# Patient Record
Sex: FEMALE | Race: OTHER | Employment: FULL TIME | URBAN - METROPOLITAN AREA
[De-identification: names, ages, dates, MRNs, and addresses within clinical notes are randomized per-mention and may not be internally consistent; named-entity substitution may affect disease eponyms.]

---

## 2019-07-26 ENCOUNTER — HOSPITAL ENCOUNTER (EMERGENCY)
Age: 24
Discharge: HOME | End: 2019-07-26
Attending: EMERGENCY MEDICINE
Payer: COMMERCIAL

## 2019-07-26 ENCOUNTER — APPOINTMENT (OUTPATIENT)
Dept: GENERAL RADIOLOGY | Age: 24
End: 2019-07-26
Payer: COMMERCIAL

## 2019-07-26 VITALS
WEIGHT: 182 LBS | HEIGHT: 59 IN | RESPIRATION RATE: 16 BRPM | OXYGEN SATURATION: 100 % | BODY MASS INDEX: 36.69 KG/M2 | TEMPERATURE: 98.5 F | SYSTOLIC BLOOD PRESSURE: 114 MMHG | HEART RATE: 81 BPM | DIASTOLIC BLOOD PRESSURE: 79 MMHG

## 2019-07-26 DIAGNOSIS — S93.491A SPRAIN OF ANTERIOR TALOFIBULAR LIGAMENT OF RIGHT ANKLE, INITIAL ENCOUNTER: Primary | ICD-10-CM

## 2019-07-26 PROCEDURE — 99283 EMERGENCY DEPT VISIT LOW MDM: CPT

## 2019-07-26 PROCEDURE — 6360000002 HC RX W HCPCS: Performed by: PHYSICIAN ASSISTANT

## 2019-07-26 PROCEDURE — 96372 THER/PROPH/DIAG INJ SC/IM: CPT

## 2019-07-26 PROCEDURE — 73610 X-RAY EXAM OF ANKLE: CPT

## 2019-07-26 RX ORDER — IBUPROFEN 800 MG/1
800 TABLET ORAL EVERY 8 HOURS PRN
Qty: 30 TABLET | Refills: 0 | Status: SHIPPED | OUTPATIENT
Start: 2019-07-26 | End: 2020-09-18

## 2019-07-26 RX ORDER — KETOROLAC TROMETHAMINE 30 MG/ML
15 INJECTION, SOLUTION INTRAMUSCULAR; INTRAVENOUS ONCE
Status: COMPLETED | OUTPATIENT
Start: 2019-07-26 | End: 2019-07-26

## 2019-07-26 RX ORDER — TRAZODONE HYDROCHLORIDE 50 MG/1
50 TABLET ORAL NIGHTLY
COMMUNITY
End: 2020-09-18

## 2019-07-26 RX ADMIN — KETOROLAC TROMETHAMINE 15 MG: 30 INJECTION, SOLUTION INTRAMUSCULAR at 20:05

## 2019-07-26 ASSESSMENT — PAIN DESCRIPTION - PAIN TYPE: TYPE: ACUTE PAIN

## 2019-07-26 ASSESSMENT — PAIN DESCRIPTION - ONSET: ONSET: SUDDEN

## 2019-07-26 ASSESSMENT — ENCOUNTER SYMPTOMS
ABDOMINAL PAIN: 0
COUGH: 0
BACK PAIN: 0
EYE PAIN: 0

## 2019-07-26 ASSESSMENT — PAIN DESCRIPTION - DESCRIPTORS: DESCRIPTORS: ACHING;SHARP;DISCOMFORT

## 2019-07-26 ASSESSMENT — PAIN DESCRIPTION - FREQUENCY: FREQUENCY: CONTINUOUS

## 2019-07-26 ASSESSMENT — PAIN SCALES - GENERAL: PAINLEVEL_OUTOF10: 9

## 2019-07-26 NOTE — ED PROVIDER NOTES
Dispense:  30 tablet     Refill:  0       CONSULTS:  None    MEDICAL Damion Mahmood / LAUREN / Michael Earnestine is a 22 y.o. female who presents the emergency department with right ankle pain. Vital signs were stable on presentation and remained stable throughout her stay. Thorough history and physical exam was performed as detailed above. Patient presents the emergency department with right ankle pain that began yesterday after working out. She states she has had difficulty ambulating throughout the day today. She took 400 mg of ibuprofen this morning with minimal relief. On physical exam, patient has tenderness palpation throughout the lateral malleolus. Mild tenderness palpation to medial malleolus. No tenderness palpation to distal tibia or throughout the foot. Neurovascularly intact distal to injury. X-ray was obtained which showed no acute osseous abnormalities. Patient was given 15 mg of Toradol in the emergency department for her symptoms which did slightly improve her pain. I have low suspicion for fracture given my physical exam findings along with negative imaging. I believe ankle sprain is likely causing her symptoms. She will be placed in an Ace bandage and given crutches. She will also be given a prescription for 800 mg ibuprofen for continued pain. The plan was thoroughly discussed with the patient she is agreeable at this time. She was given strict return precautions and discharged home. This patient was also evaluated by the attending physician. All care plans were discussed and agreed upon. Clinical Impression     1.  Sprain of anterior talofibular ligament of right ankle, initial encounter        Disposition     PATIENT REFERRED TO:  [facility] Emergency Department  [address] [facility]  [address] [facility]  Go to   If symptoms worsen      DISCHARGE MEDICATIONS:  New Prescriptions    IBUPROFEN (ADVIL;MOTRIN) 800 MG TABLET    Take 1

## 2019-08-15 ENCOUNTER — HOSPITAL ENCOUNTER (EMERGENCY)
Age: 24
Discharge: HOME | End: 2019-08-15
Attending: EMERGENCY MEDICINE
Payer: COMMERCIAL

## 2019-08-15 VITALS
HEART RATE: 69 BPM | BODY MASS INDEX: 36.36 KG/M2 | SYSTOLIC BLOOD PRESSURE: 101 MMHG | OXYGEN SATURATION: 98 % | DIASTOLIC BLOOD PRESSURE: 72 MMHG | RESPIRATION RATE: 20 BRPM | TEMPERATURE: 98 F | WEIGHT: 180 LBS

## 2019-08-15 DIAGNOSIS — G40.919 BREAKTHROUGH SEIZURE (HCC): Primary | ICD-10-CM

## 2019-08-15 LAB
AMPHETAMINE SCREEN, URINE: ABNORMAL
ANION GAP SERPL CALCULATED.3IONS-SCNC: 11 MMOL/L (ref 3–16)
BACTERIA: ABNORMAL /HPF
BARBITURATE SCREEN URINE: POSITIVE
BENZODIAZEPINE SCREEN, URINE: ABNORMAL
BILIRUBIN URINE: NEGATIVE
BLOOD, URINE: ABNORMAL
BUN BLDV-MCNC: 13 MG/DL (ref 7–20)
CALCIUM SERPL-MCNC: 8.9 MG/DL (ref 8.3–10.6)
CANNABINOID SCREEN URINE: ABNORMAL
CHLORIDE BLD-SCNC: 102 MMOL/L (ref 99–110)
CLARITY: ABNORMAL
CO2: 22 MMOL/L (ref 21–32)
COCAINE METABOLITE SCREEN URINE: ABNORMAL
COLOR: YELLOW
CREAT SERPL-MCNC: 0.7 MG/DL (ref 0.6–1.1)
EPITHELIAL CELLS, UA: ABNORMAL /HPF
GFR AFRICAN AMERICAN: >60
GFR NON-AFRICAN AMERICAN: >60
GLUCOSE BLD-MCNC: 96 MG/DL (ref 70–99)
GLUCOSE URINE: NEGATIVE MG/DL
KETONES, URINE: 15 MG/DL
LEUKOCYTE ESTERASE, URINE: NEGATIVE
Lab: ABNORMAL
MAGNESIUM: 1.8 MG/DL (ref 1.8–2.4)
METHADONE SCREEN, URINE: ABNORMAL
MICROSCOPIC EXAMINATION: YES
NITRITE, URINE: NEGATIVE
OPIATE SCREEN URINE: ABNORMAL
OXYCODONE URINE: ABNORMAL
PH UA: 5
PH UA: 6 (ref 5–8)
PHENCYCLIDINE SCREEN URINE: ABNORMAL
POTASSIUM REFLEX MAGNESIUM: 3.5 MMOL/L (ref 3.5–5.1)
PROPOXYPHENE SCREEN: ABNORMAL
PROTEIN UA: 30 MG/DL
RBC UA: >100 /HPF (ref 0–2)
SODIUM BLD-SCNC: 135 MMOL/L (ref 136–145)
SPECIFIC GRAVITY UA: >=1.03 (ref 1–1.03)
URINE REFLEX TO CULTURE: ABNORMAL
URINE TYPE: ABNORMAL
UROBILINOGEN, URINE: 0.2 E.U./DL
WBC UA: ABNORMAL /HPF (ref 0–5)

## 2019-08-15 PROCEDURE — 80048 BASIC METABOLIC PNL TOTAL CA: CPT

## 2019-08-15 PROCEDURE — 81001 URINALYSIS AUTO W/SCOPE: CPT

## 2019-08-15 PROCEDURE — 80183 DRUG SCRN QUANT OXCARBAZEPIN: CPT

## 2019-08-15 PROCEDURE — 6370000000 HC RX 637 (ALT 250 FOR IP): Performed by: STUDENT IN AN ORGANIZED HEALTH CARE EDUCATION/TRAINING PROGRAM

## 2019-08-15 PROCEDURE — 80307 DRUG TEST PRSMV CHEM ANLYZR: CPT

## 2019-08-15 PROCEDURE — 83735 ASSAY OF MAGNESIUM: CPT

## 2019-08-15 PROCEDURE — 99284 EMERGENCY DEPT VISIT MOD MDM: CPT

## 2019-08-15 RX ORDER — IBUPROFEN 400 MG/1
800 TABLET ORAL ONCE
Status: COMPLETED | OUTPATIENT
Start: 2019-08-15 | End: 2019-08-15

## 2019-08-15 RX ADMIN — IBUPROFEN 800 MG: 400 TABLET ORAL at 14:56

## 2019-08-15 ASSESSMENT — PAIN - FUNCTIONAL ASSESSMENT: PAIN_FUNCTIONAL_ASSESSMENT: PREVENTS OR INTERFERES SOME ACTIVE ACTIVITIES AND ADLS

## 2019-08-15 ASSESSMENT — PAIN DESCRIPTION - FREQUENCY: FREQUENCY: CONTINUOUS

## 2019-08-15 ASSESSMENT — PAIN DESCRIPTION - DESCRIPTORS: DESCRIPTORS: CONSTANT

## 2019-08-15 ASSESSMENT — PAIN SCALES - GENERAL
PAINLEVEL_OUTOF10: 5
PAINLEVEL_OUTOF10: 6

## 2019-08-15 ASSESSMENT — PAIN DESCRIPTION - ORIENTATION: ORIENTATION: ANTERIOR

## 2019-08-15 ASSESSMENT — PAIN DESCRIPTION - ONSET: ONSET: SUDDEN

## 2019-08-15 ASSESSMENT — PAIN DESCRIPTION - PROGRESSION: CLINICAL_PROGRESSION: GRADUALLY WORSENING

## 2019-08-15 ASSESSMENT — PAIN DESCRIPTION - LOCATION: LOCATION: HEAD

## 2019-08-15 ASSESSMENT — PAIN SCALES - WONG BAKER: WONGBAKER_NUMERICALRESPONSE: 4

## 2019-08-15 ASSESSMENT — PAIN DESCRIPTION - PAIN TYPE: TYPE: ACUTE PAIN

## 2019-08-15 NOTE — ED PROVIDER NOTES
ED Attending Attestation Note     Date of evaluation: 8/15/2019    This patient was seen by the resident. I have seen and examined the patient, agree with the workup, evaluation, management and diagnosis. The care plan has been discussed. My assessment reveals Patient with history of seizures both absence and grand mal who presents after 2 absence seizure's today at work. Patient is on Trileptal and reports missing no doses recently. On exam alert and oriented x3. Moves all 4 extremities. No seizure activity noted during my exam.  He sees a neurologist in [facility].      Hannah Magaña MD  08/15/19 8206

## 2019-08-20 LAB — OXCARBAZEPINE: <1 UG/ML (ref 3–35)

## 2019-10-27 ENCOUNTER — HOSPITAL ENCOUNTER (EMERGENCY)
Age: 24
Discharge: HOME | End: 2019-10-27
Attending: EMERGENCY MEDICINE
Payer: COMMERCIAL

## 2019-10-27 VITALS
BODY MASS INDEX: 36.29 KG/M2 | RESPIRATION RATE: 20 BRPM | DIASTOLIC BLOOD PRESSURE: 80 MMHG | OXYGEN SATURATION: 100 % | HEART RATE: 78 BPM | SYSTOLIC BLOOD PRESSURE: 109 MMHG | HEIGHT: 59 IN | WEIGHT: 180 LBS | TEMPERATURE: 98.7 F

## 2019-10-27 DIAGNOSIS — G43.C1 INTRACTABLE PERIODIC HEADACHE SYNDROME: Primary | ICD-10-CM

## 2019-10-27 PROCEDURE — 80183 DRUG SCRN QUANT OXCARBAZEPIN: CPT

## 2019-10-27 PROCEDURE — 2580000003 HC RX 258: Performed by: STUDENT IN AN ORGANIZED HEALTH CARE EDUCATION/TRAINING PROGRAM

## 2019-10-27 PROCEDURE — 96361 HYDRATE IV INFUSION ADD-ON: CPT

## 2019-10-27 PROCEDURE — 96374 THER/PROPH/DIAG INJ IV PUSH: CPT

## 2019-10-27 PROCEDURE — 6370000000 HC RX 637 (ALT 250 FOR IP): Performed by: STUDENT IN AN ORGANIZED HEALTH CARE EDUCATION/TRAINING PROGRAM

## 2019-10-27 PROCEDURE — 6360000002 HC RX W HCPCS: Performed by: EMERGENCY MEDICINE

## 2019-10-27 PROCEDURE — 99283 EMERGENCY DEPT VISIT LOW MDM: CPT

## 2019-10-27 RX ORDER — KETOROLAC TROMETHAMINE 30 MG/ML
15 INJECTION, SOLUTION INTRAMUSCULAR; INTRAVENOUS ONCE
Status: COMPLETED | OUTPATIENT
Start: 2019-10-27 | End: 2019-10-27

## 2019-10-27 RX ORDER — SODIUM CHLORIDE, SODIUM LACTATE, POTASSIUM CHLORIDE, AND CALCIUM CHLORIDE .6; .31; .03; .02 G/100ML; G/100ML; G/100ML; G/100ML
1000 INJECTION, SOLUTION INTRAVENOUS ONCE
Status: COMPLETED | OUTPATIENT
Start: 2019-10-27 | End: 2019-10-27

## 2019-10-27 RX ORDER — ACETAMINOPHEN 500 MG
1000 TABLET ORAL ONCE
Status: COMPLETED | OUTPATIENT
Start: 2019-10-27 | End: 2019-10-27

## 2019-10-27 RX ORDER — KETOROLAC TROMETHAMINE 30 MG/ML
15 INJECTION, SOLUTION INTRAMUSCULAR; INTRAVENOUS ONCE
Status: DISCONTINUED | OUTPATIENT
Start: 2019-10-27 | End: 2019-10-27

## 2019-10-27 RX ADMIN — SODIUM CHLORIDE, POTASSIUM CHLORIDE, SODIUM LACTATE AND CALCIUM CHLORIDE 1000 ML: 600; 310; 30; 20 INJECTION, SOLUTION INTRAVENOUS at 17:04

## 2019-10-27 RX ADMIN — ACETAMINOPHEN 1000 MG: 500 TABLET ORAL at 17:04

## 2019-10-27 RX ADMIN — KETOROLAC TROMETHAMINE 15 MG: 30 INJECTION, SOLUTION INTRAMUSCULAR at 17:04

## 2019-10-27 ASSESSMENT — ENCOUNTER SYMPTOMS
TROUBLE SWALLOWING: 0
CONSTIPATION: 0
CHEST TIGHTNESS: 0
SINUS PAIN: 0
ABDOMINAL PAIN: 0
WHEEZING: 0
APNEA: 0
DIARRHEA: 0
COUGH: 0
RECTAL PAIN: 0
NAUSEA: 0
ABDOMINAL DISTENTION: 0
STRIDOR: 0
SORE THROAT: 0
BACK PAIN: 0
RHINORRHEA: 0
SHORTNESS OF BREATH: 0
VOMITING: 0

## 2019-10-27 ASSESSMENT — PAIN DESCRIPTION - PAIN TYPE: TYPE: ACUTE PAIN

## 2019-10-27 ASSESSMENT — PAIN SCALES - GENERAL
PAINLEVEL_OUTOF10: 4
PAINLEVEL_OUTOF10: 9

## 2019-10-27 ASSESSMENT — PAIN DESCRIPTION - LOCATION: LOCATION: HEAD

## 2019-10-29 LAB — OXCARBAZEPINE: <1 UG/ML (ref 3–35)

## 2020-09-12 ENCOUNTER — HOSPITAL ENCOUNTER (EMERGENCY)
Age: 25
Discharge: HOME | End: 2020-09-12
Attending: EMERGENCY MEDICINE
Payer: COMMERCIAL

## 2020-09-12 VITALS
DIASTOLIC BLOOD PRESSURE: 94 MMHG | TEMPERATURE: 98.7 F | RESPIRATION RATE: 18 BRPM | BODY MASS INDEX: 31.28 KG/M2 | SYSTOLIC BLOOD PRESSURE: 128 MMHG | WEIGHT: 170 LBS | HEIGHT: 62 IN | OXYGEN SATURATION: 99 % | HEART RATE: 90 BPM

## 2020-09-12 PROCEDURE — 99284 EMERGENCY DEPT VISIT MOD MDM: CPT

## 2020-09-12 ASSESSMENT — ENCOUNTER SYMPTOMS
EYE PAIN: 0
WHEEZING: 0
CHEST TIGHTNESS: 0
COUGH: 0
CONSTIPATION: 0
DIARRHEA: 0
NAUSEA: 0
SHORTNESS OF BREATH: 0
ABDOMINAL PAIN: 0
VOMITING: 0
BLOOD IN STOOL: 0

## 2020-09-12 NOTE — ED PROVIDER NOTES
[address] [facility] RESIDENT NOTE       Date of evaluation: 9/12/2020    Chief Complaint     Seizures (needs return to work note after a sz yesterday)      of Present Illness     Neymar Elise is a 26 y.o. female history of seizures on Trileptal presents emergency department for evaluation of seizures. Patient states that she had 2 seizures yesterday. The seizures occurred while she was laying in bed and were witnessed by wife. Seizures both lasted less than a minute, tonic-clonic, and patient does not remember them. No urinary or bowel incontinence no tongue biting. Patient states this is typical of her seizures. Patient denies any head injury or fall. Patient states that she is on Trileptal but she has had a hard month and has missed a few doses this month, including last weekend. Patient is seeing neurology and has an upcoming EMU appointment. Patient denies recently feeling ill, any symptoms of chest pain shortness of breath lightheadedness headaches, confusion. Patient has been at her baseline since the seizures. Patient went to work this morning and was told that if she has more than 1 seizure she needs to be evaluated by provider. Patient called her neurologist but he is not in the office until [phone], prompting her visit to the emergency department. Review of Systems     Review of Systems   Constitutional: Negative for appetite change, chills and fever. HENT: Negative for ear pain. Eyes: Negative for pain. Respiratory: Negative for cough, chest tightness, shortness of breath and wheezing. Cardiovascular: Negative for chest pain. Gastrointestinal: Negative for abdominal pain, blood in stool, constipation, diarrhea, nausea and vomiting. Genitourinary: Negative for difficulty urinating, dysuria, flank pain and hematuria. Skin: Negative for rash. Neurological: Positive for seizures.  Negative for light-headedness, numbness and headaches. Past Medical, Surgical, Family, and Social History     She has a past medical history of Seizures ([address] She has no past surgical history on file. Her family history is not on file. She reports that she has been smoking cigarettes. She has a 1.50 pack-year smoking history. She has never used smokeless tobacco. She reports current alcohol use. She reports that she does not use drugs. Medications     Previous Medications    IBUPROFEN (ADVIL;MOTRIN) 800 MG TABLET    Take 1 tablet by mouth every 8 hours as needed for Pain    OXCARBAZEPINE (TRILEPTAL PO)    Take 1 tablet by mouth 2 times daily    TRAZODONE (DESYREL) 50 MG TABLET    Take 50 mg by mouth nightly       Allergies     She is allergic to reglan [metoclopramide] and zofran [ondansetron hcl]. Physical Exam     INITIAL VITALS: BP: (!) 128/94, Temp: 98.7 °F (37.1 °C), Pulse: 90, Resp: 18, SpO2: 99 %   Physical Exam    RECENT VITALS:  BP: (!) 128/94, Temp: 98.7 °F (37.1 °C), Pulse: 90,Resp: 18, SpO2: 99 %     DiagnosticResults     RADIOLOGY:  No orders to display       LABS:   No results found for this visit on 09/12/20. ED Course     Nursing Notes, Past Medical Hx, Past Surgical Hx, Social Hx, Allergies, and Family Hx were reviewed. The patient was given the followingmedications:  No orders of the defined types were placed in this encounter. CONSULTS:  None    MEDICAL DECISION MAKING / ASSESSMENT / Samm Eze is a 26 y.o. female with a history of seizures on Trileptal presenting to the emergency department for evaluation of seizure. Patient has 2 seizures yesterday, typical for seizures, and patient has not taken her Trileptal daily as prescribed, likely leading to the seizures. Patient has normal vital signs, is well-appearing, and denies any recent illness or feeling sick. Exam unremarkable, including no focal neurologic deficits, and patient is able to ambulate with no difficulty.   Patient denies any head trauma, no evidence of head trauma on exam, to indicate further imaging is necessary at this time. Will provide patient with a note for work and advised that she follow-up with her neurologist as planned for further evaluation. This patient was also evaluated by the attending physician. All care plans werediscussed and agreed upon. Clinical Impression     1. Seizure ([facility] [address]        Disposition     PATIENT REFERRED TO:  No follow-up provider specified.     DISCHARGE MEDICATIONS:  New Prescriptions    No medications on file       DISPOSITION Decision To Discharge 09/12/2020 02:27:50 PM       Lorena Cespedes MD  Resident  09/12/20 [id number]

## 2020-09-12 NOTE — ED PROVIDER NOTES
ED Attending Attestation Note     Date of evaluation: 9/12/2020    This patient was seen by the resident, Dr Daniel Olivares. I have seen and examined the patient, agree with the workup, evaluation, management and diagnosis. The care plan has been discussed. This is a pleasant 56-year-old female with a past medical history of seizure disorder that presents today for evaluation of reported seizures. The patient states that she works as a , was on the phone with a client and had a seizure. The seizure was witnessed by her wife. She states that she has an aura prior to seizures. She states that his seizures started approximately 2 years ago. She denies any head trauma. She denies any illicit drug use. She states that she smokes cigarettes about a half a pack a day. She denies any alcohol use, or history of alcohol withdrawal seizures. She states that her work requires that she be seen by a physician prior to returning to work. On exam, the patient is well-appearing, no acute distress. Her GCS is 15. Her extraocular movements are intact, pupils are equal reactive to light. Her neck is supple. She has 5 out of 5 strength in the upper and lower extremities. Her gait is normal.  She has a regular rate and rhythm, lung sounds are clear and equal bilaterally. Tobacco Abuse Counseling  This patient is currently admits to smoking tobacco. The risks related to smoking (including increased risk of heart disease, morbidity, mortality and end organ damage) were reviewed with this patient. I recommended maintaining a smoke-free lifestyle and suggested products available for smoking cessation (specifically the gum and the patch) with the patient as well. The patient states she wishes to try gum. The amount of time spent on this counseling in the Emergency Department was 4 minutes.  -- Bridger Hardy MD - 9/12/2020 2:24 PM      At this point, the patient has no acute abnormalities on exam and is at baseline. She is stable for discharge, and was told that she can return to work. She was told to come back to the emergency department if she has a seizure lasting greater than 5 minutes. She is to follow with her neurologist to discuss possible medication changes. Given that she does not have a seizure at this time will defer consultation to neurology or medication changes at this time. Please see resident note for reassessment and final disposition.      Tamiko Patel MD  09/12/20 [id number]

## 2020-09-12 NOTE — ED TRIAGE NOTES
Pt presents to the ED from home requesting a return to work note, as pt reports she had a seizure yesterday. PT denies hitting her head. Pt a/o x4, answering questions appropriately.

## 2020-09-12 NOTE — LETTER
Karen Ennis was seen and treated in our emergency department on 9/12/2020. She may return to work on 9/12 with no limitations. If you have any questions or concerns, please don't hesitate to call.       Chary Guerra MD

## 2020-09-18 ENCOUNTER — APPOINTMENT (OUTPATIENT)
Dept: GENERAL RADIOLOGY | Age: 25
End: 2020-09-18
Payer: COMMERCIAL

## 2020-09-18 ENCOUNTER — HOSPITAL ENCOUNTER (EMERGENCY)
Age: 25
Discharge: HOME | End: 2020-09-18
Payer: COMMERCIAL

## 2020-09-18 VITALS
HEART RATE: 87 BPM | RESPIRATION RATE: 20 BRPM | OXYGEN SATURATION: 98 % | SYSTOLIC BLOOD PRESSURE: 126 MMHG | DIASTOLIC BLOOD PRESSURE: 84 MMHG | TEMPERATURE: 98.8 F | WEIGHT: 175 LBS | BODY MASS INDEX: 35.28 KG/M2 | HEIGHT: 59 IN

## 2020-09-18 PROCEDURE — 6360000002 HC RX W HCPCS: Performed by: PHYSICIAN ASSISTANT

## 2020-09-18 PROCEDURE — 99283 EMERGENCY DEPT VISIT LOW MDM: CPT

## 2020-09-18 PROCEDURE — 96372 THER/PROPH/DIAG INJ SC/IM: CPT

## 2020-09-18 PROCEDURE — 6370000000 HC RX 637 (ALT 250 FOR IP): Performed by: PHYSICIAN ASSISTANT

## 2020-09-18 PROCEDURE — 71101 X-RAY EXAM UNILAT RIBS/CHEST: CPT

## 2020-09-18 RX ORDER — LIDOCAINE 50 MG/G
1 PATCH TOPICAL DAILY
Qty: 30 PATCH | Refills: 0 | Status: SHIPPED | OUTPATIENT
Start: 2020-09-18 | End: 2022-02-04 | Stop reason: ALTCHOICE

## 2020-09-18 RX ORDER — IBUPROFEN 600 MG/1
600 TABLET ORAL EVERY 6 HOURS PRN
Qty: 30 TABLET | Refills: 0 | Status: SHIPPED | OUTPATIENT
Start: 2020-09-18 | End: 2022-02-04 | Stop reason: ALTCHOICE

## 2020-09-18 RX ORDER — HYDROCODONE BITARTRATE AND ACETAMINOPHEN 5; 325 MG/1; MG/1
1 TABLET ORAL ONCE
Status: COMPLETED | OUTPATIENT
Start: 2020-09-18 | End: 2020-09-18

## 2020-09-18 RX ORDER — LIDOCAINE 4 G/G
1 PATCH TOPICAL ONCE
Status: DISCONTINUED | OUTPATIENT
Start: 2020-09-18 | End: 2020-09-18 | Stop reason: HOSPADM

## 2020-09-18 RX ORDER — ACETAMINOPHEN 325 MG/1
325 TABLET ORAL EVERY 6 HOURS PRN
Qty: 30 TABLET | Refills: 0 | Status: SHIPPED | OUTPATIENT
Start: 2020-09-18 | End: 2022-02-04 | Stop reason: ALTCHOICE

## 2020-09-18 RX ORDER — KETOROLAC TROMETHAMINE 30 MG/ML
15 INJECTION, SOLUTION INTRAMUSCULAR; INTRAVENOUS ONCE
Status: COMPLETED | OUTPATIENT
Start: 2020-09-18 | End: 2020-09-18

## 2020-09-18 RX ADMIN — HYDROCODONE BITARTRATE AND ACETAMINOPHEN 1 TABLET: 5; 325 TABLET ORAL at 09:46

## 2020-09-18 RX ADMIN — KETOROLAC TROMETHAMINE 15 MG: 30 INJECTION, SOLUTION INTRAMUSCULAR at 09:46

## 2020-09-18 ASSESSMENT — ENCOUNTER SYMPTOMS
GASTROINTESTINAL NEGATIVE: 1
SHORTNESS OF BREATH: 0
ABDOMINAL PAIN: 0
COUGH: 0
BACK PAIN: 0
RESPIRATORY NEGATIVE: 1

## 2020-09-18 ASSESSMENT — PAIN DESCRIPTION - LOCATION: LOCATION: RIB CAGE

## 2020-09-18 ASSESSMENT — PAIN SCALES - GENERAL
PAINLEVEL_OUTOF10: 8
PAINLEVEL_OUTOF10: 8

## 2020-09-18 ASSESSMENT — PAIN DESCRIPTION - PAIN TYPE: TYPE: ACUTE PAIN

## 2020-09-18 ASSESSMENT — PAIN DESCRIPTION - FREQUENCY: FREQUENCY: CONTINUOUS

## 2020-09-18 ASSESSMENT — PAIN DESCRIPTION - DESCRIPTORS: DESCRIPTORS: TENDER;SORE

## 2020-09-18 ASSESSMENT — PAIN DESCRIPTION - ORIENTATION: ORIENTATION: LEFT

## 2020-09-18 NOTE — ED NOTES
Pt D/C ambulatory. Went over D/C instructions and medications with pt, pt verbalized understanding and all questions were answered. Pt educated on use of incentive spirometer.    And given Good Rx card     Tiffanie Jones RN  09/18/20 0429

## 2020-09-18 NOTE — ED PROVIDER NOTES
[address] ENCOUNTER          PHYSICIAN ASSISTANT NOTE       Date of evaluation: 9/18/2020    Chief Complaint     Rib Pain (Fall in bathtub last night. Slip and fall. Left posterior rib pain with bruising. ) and Fall      History of Present Illness     Meena Berman is a 26 y.o. female who presents rib pain. Patient slipped in the shower yesterday, falling onto the tub on her left side. Patient with pain to the left ribs only. Pain is worse with movement and deep breath. No head injury or loss of consciousness. No neck or back pain. Patient has been ambulatory since the fall. Patient is not on blood thinners. Patient took ibuprofen and Tylenol last night with relief, woke up with continued pain today. No difficulty breathing or hemoptysis. Otherwise well. Review of Systems     Review of Systems   Constitutional: Negative. Negative for fever. Respiratory: Negative. Negative for cough and shortness of breath. Cardiovascular: Negative for chest pain. +rib pain   Gastrointestinal: Negative. Negative for abdominal pain. Musculoskeletal: Negative. Negative for back pain and neck pain. Skin: Negative. Neurological: Negative. All other systems reviewed and are negative. Past Medical, Surgical, Family, and Social History     She has a past medical history of Seizures ([facility] [address] She has no past surgical history on file. Her family history is not on file. She reports that she has been smoking cigarettes. She has a 1.50 pack-year smoking history. She has never used smokeless tobacco. She reports current alcohol use. She reports that she does not use drugs. Medications     Previous Medications    OXCARBAZEPINE (TRILEPTAL PO)    Take 1 tablet by mouth 2 times daily       Allergies     She is allergic to reglan [metoclopramide] and zofran [ondansetron hcl].     Physical Exam     INITIAL VITALS: BP: 126/84, Temp: 98.8 °F (37.1 °C), Pulse: 87, Resp: 20, SpO2: 98 %  Physical Exam  Vitals signs and nursing note reviewed. Constitutional:       General: She is not in acute distress. Appearance: She is not ill-appearing. HENT:      Head: Normocephalic and atraumatic. Right Ear: External ear normal.      Left Ear: External ear normal.   Neck:      Musculoskeletal: Normal range of motion and neck supple. No muscular tenderness. Cardiovascular:      Rate and Rhythm: Normal rate and regular rhythm. Pulses: Normal pulses. Heart sounds: Normal heart sounds. Pulmonary:      Effort: Pulmonary effort is normal.      Breath sounds: Normal breath sounds. Abdominal:      General: There is no distension. Palpations: Abdomen is soft. Tenderness: There is no abdominal tenderness. Musculoskeletal: Normal range of motion. Comments: No C/T/L midline tenderness. Tenderness to the left posterior lateral ribs with a small area of ecchymosis. No crepitus. Skin:     General: Skin is warm and dry. Neurological:      General: No focal deficit present. Mental Status: She is alert and oriented to person, place, and time. Mental status is at baseline. Psychiatric:         Mood and Affect: Mood normal.         Behavior: Behavior normal.         Thought Content: Thought content normal.         Judgment: Judgment normal.         Diagnostic Results       RADIOLOGY:  XR RIBS LEFT INCLUDE CHEST (MIN 3 VIEWS)   Final Result      CHEST: Frontal view demonstrates clear lungs and a normal cardiac mediastinal silhouette. There is no pneumothorax. Left ribs: 3 views demonstrate no fracture. LABS:   No results found for this visit on 09/18/20. ED BEDSIDE ULTRASOUND:      RECENT VITALS:  BP: 126/84, Temp: 98.8 °F (37.1 °C), Pulse: 87, Resp: 20, SpO2: 98 %     Procedures         ED Course     Nursing Notes, Past Medical Hx,Past Surgical Hx, Social Hx, Allergies, and Family Hx were reviewed.     The patient was given the following medications:  Orders Placed This Encounter   Medications    ketorolac (TORADOL) injection 15 mg    HYDROcodone-acetaminophen (NORCO) 5-325 MG per tablet 1 tablet    lidocaine 4 % external patch 1 patch    ibuprofen (ADVIL;MOTRIN) 600 MG tablet     Sig: Take 1 tablet by mouth every 6 hours as needed for Pain     Dispense:  30 tablet     Refill:  0    acetaminophen (TYLENOL) 325 MG tablet     Sig: Take 1 tablet by mouth every 6 hours as needed for Pain     Dispense:  30 tablet     Refill:  0    lidocaine (LIDODERM) 5 %     Sig: Place 1 patch onto the skin daily 12 hours on, 12 hours off. Dispense:  30 patch     Refill:  0       CONSULTS:  None    MEDICAL DECISION MAKING / ASSESSMENT / Savita Nic is a 26 y.o. female with rib pain. Patient is well-appearing and in no acute distress. Vitals are normal.  Patient with tenderness to the left posterior lateral ribs with a small ecchymotic area. No crepitus. Lungs clear. Abdomen benign. No midline spine tenderness. Patient is not on blood thinners. Patient given Toradol, Norco, lidocaine. X-rays of the chest and left ribs show no acute process. Patient instructed on incentive spirometry. Will discharged with symptomatic management. Follow-up with primary care next week. Return precautions discussed. Clinical Impression     1.  Contusion of rib on left side, initial encounter        Disposition     PATIENT REFERRED TO:  Maeve Arriaga [facility] [address]    Schedule an appointment as soon as possible for a visit       [facility] Emergency Department  [address]  [phone]    As needed, If symptoms worsen      DISCHARGE MEDICATIONS:  New Prescriptions    ACETAMINOPHEN (TYLENOL) 325 MG TABLET    Take 1 tablet by mouth every 6 hours as needed for Pain    IBUPROFEN (ADVIL;MOTRIN) 600 MG TABLET    Take 1 tablet by mouth every 6 hours as needed for Pain LIDOCAINE (LIDODERM) 5 %    Place 1 patch onto the skin daily 12 hours on, 12 hours off.        DISPOSITION Discharge - Pending Orders Complete 09/18/2020 10:33:56 AM        Larisa Velasco PA-C  09/18/20 1107

## 2021-06-15 ENCOUNTER — CLINICAL DOCUMENTATION (OUTPATIENT)
Dept: OTHER | Age: 26
End: 2021-06-15

## 2022-02-04 ENCOUNTER — APPOINTMENT (OUTPATIENT)
Dept: GENERAL RADIOLOGY | Age: 27
End: 2022-02-04
Payer: COMMERCIAL

## 2022-02-04 ENCOUNTER — HOSPITAL ENCOUNTER (EMERGENCY)
Age: 27
Discharge: HOME | End: 2022-02-04
Attending: EMERGENCY MEDICINE
Payer: COMMERCIAL

## 2022-02-04 VITALS
DIASTOLIC BLOOD PRESSURE: 76 MMHG | TEMPERATURE: 98.2 F | OXYGEN SATURATION: 100 % | SYSTOLIC BLOOD PRESSURE: 122 MMHG | RESPIRATION RATE: 10 BRPM | BODY MASS INDEX: 34.82 KG/M2 | WEIGHT: 172.7 LBS | HEIGHT: 59 IN | HEART RATE: 86 BPM

## 2022-02-04 DIAGNOSIS — S50.01XA CONTUSION OF RIGHT ELBOW, INITIAL ENCOUNTER: Primary | ICD-10-CM

## 2022-02-04 DIAGNOSIS — S39.012A STRAIN OF LUMBAR REGION, INITIAL ENCOUNTER: ICD-10-CM

## 2022-02-04 PROCEDURE — 72100 X-RAY EXAM L-S SPINE 2/3 VWS: CPT

## 2022-02-04 PROCEDURE — 99283 EMERGENCY DEPT VISIT LOW MDM: CPT

## 2022-02-04 PROCEDURE — 73070 X-RAY EXAM OF ELBOW: CPT

## 2022-02-04 RX ORDER — KETOROLAC TROMETHAMINE 10 MG/1
10 TABLET, FILM COATED ORAL EVERY 8 HOURS PRN
Qty: 12 TABLET | Refills: 0 | Status: SHIPPED | OUTPATIENT
Start: 2022-02-04 | End: 2022-04-11 | Stop reason: ALTCHOICE

## 2022-02-04 RX ORDER — TRAMADOL HYDROCHLORIDE 50 MG/1
50 TABLET ORAL ONCE
Status: DISCONTINUED | OUTPATIENT
Start: 2022-02-04 | End: 2022-02-04 | Stop reason: HOSPADM

## 2022-02-04 RX ORDER — NAPROXEN 500 MG/1
500 TABLET ORAL ONCE
Status: DISCONTINUED | OUTPATIENT
Start: 2022-02-04 | End: 2022-02-04 | Stop reason: HOSPADM

## 2022-02-04 RX ORDER — CYCLOBENZAPRINE HCL 10 MG
10 TABLET ORAL 3 TIMES DAILY PRN
Qty: 21 TABLET | Refills: 0 | Status: SHIPPED | OUTPATIENT
Start: 2022-02-04 | End: 2022-02-11

## 2022-02-04 RX ORDER — CYCLOBENZAPRINE HCL 10 MG
10 TABLET ORAL ONCE
Status: DISCONTINUED | OUTPATIENT
Start: 2022-02-04 | End: 2022-02-04 | Stop reason: HOSPADM

## 2022-02-04 ASSESSMENT — PAIN DESCRIPTION - LOCATION: LOCATION: ARM;BACK

## 2022-02-04 ASSESSMENT — PAIN DESCRIPTION - DESCRIPTORS: DESCRIPTORS: DISCOMFORT

## 2022-02-04 ASSESSMENT — ENCOUNTER SYMPTOMS
COLOR CHANGE: 0
BACK PAIN: 1

## 2022-02-04 ASSESSMENT — PAIN DESCRIPTION - PAIN TYPE: TYPE: ACUTE PAIN

## 2022-02-04 ASSESSMENT — PAIN DESCRIPTION - ORIENTATION: ORIENTATION: RIGHT

## 2022-02-04 ASSESSMENT — PAIN SCALES - GENERAL: PAINLEVEL_OUTOF10: 10

## 2022-02-04 NOTE — Clinical Note
Diana Maharaj was seen and treated in our emergency department on 2/4/2022. She may return to work on 02/07/2022. If you have any questions or concerns, please don't hesitate to call.       Diana Collins MD

## 2022-02-04 NOTE — ED PROVIDER NOTES
Emergency Department Provider Note  Location: [address] [place] [address]  2/4/2022     Patient Identification  Fatmata Soriano is a 23 y.o. female    Chief Complaint  Fall and Arm Injury (right elbow)      Mode of Arrival  private car    HPI  (History provided by patient)  This is a 23 y.o. female presented today for right elbow pain, low back pain. Patient was walking out of her residence heading into work when she slipped on ice and fell down steps. She states her right elbow and her lower back struck the edge of the steps and she felt immediate sharp pain. The pain is worse in the right elbow. She rates that pain 10/10 intensity. She is right-hand dominant. She is unable to fully extend her right elbow due to the pain. She denies numbness or tingling distally. No weakness of the hand. Her lower back pain is not as intense. She states there was initial radiation of pain into the buttock but that has resolved. Her pain is now strictly in the lower back. No numbness or tingling or motor weakness in her leg. No bowel or urinary retention or incontinence. No saddle paresthesia. She denies loss of consciousness. She states she did not hit her head. No neck pain    ROS  Review of Systems   Musculoskeletal: Positive for arthralgias and back pain. Negative for neck pain. Skin: Negative for color change and wound. Neurological: Negative for syncope, weakness, numbness and headaches. I have reviewed the following nursing documentation:  Allergies: Allergies   Allergen Reactions    Reglan [Metoclopramide] Shortness Of Breath     Rapid breathing    Zofran [Ondansetron Hcl] Shortness Of Breath       Past medical history:  has a past medical history of Seizures ([facility] [address] Past surgical history:  has no past surgical history on file. Home medications: none reported    Social history:  reports that she has been smoking cigarettes. She has a 1.50 pack-year smoking history.  She has never used smokeless tobacco. She reports current alcohol use. She reports that she does not use drugs. Family history:  History reviewed. No pertinent family history. Exam  ED Triage Vitals [02/04/22 1030]   BP Temp Temp Source Pulse Resp SpO2 Height Weight   122/76 98.2 °F (36.8 °C) Oral 86 10 100 % 4' 11\" (1.499 m) 172 lb 11.2 oz (78.3 kg)   Physical Exam  Vitals and nursing note reviewed. Constitutional:       Appearance: Normal appearance. She is well-developed. She is not diaphoretic. Comments: Patient appears very uncomfortable secondary to pain and holding her right elbow in a flexed position and her arm close to her body. HENT:      Head: Normocephalic and atraumatic. Eyes:      General:         Right eye: No discharge. Left eye: No discharge. Neck:      Trachea: No tracheal deviation. Cardiovascular:      Pulses:           Radial pulses are 2+ on the right side. Pulmonary:      Effort: Pulmonary effort is normal. No respiratory distress. Musculoskeletal:      Right elbow: Swelling present. Decreased range of motion. Tenderness present in radial head and olecranon process. Right wrist: No swelling, deformity or tenderness. Right hand: Normal.      Cervical back: No tenderness. Thoracic back: No tenderness. Lumbar back: Tenderness (mid lumbar area) present. Right lower leg: No edema. Left lower leg: No edema. Comments: Compartments of the right arm soft. Skin:     General: Skin is warm and dry. Capillary Refill: Capillary refill takes less than 2 seconds. Coloration: Skin is not pale. Findings: No bruising or wound. Neurological:      General: No focal deficit present. Mental Status: She is alert and oriented to person, place, and time. Cranial Nerves: No dysarthria or facial asymmetry. Sensory: No sensory deficit. Motor: No weakness, tremor or abnormal muscle tone.       Gait: Gait normal.   Psychiatric: Mood and Affect: Mood normal.         Speech: Speech normal.         Behavior: Behavior normal. Behavior is cooperative. MDM/ED Course  ED Medication Orders (From admission, onward)    Start Ordered     Status Ordering Provider    02/04/22 1115 02/04/22 1107  cyclobenzaprine (FLEXERIL) tablet 10 mg  Thais Huyn[facility], [facility]    02/04/22 1115 02/04/22 1107  naproxen (NAPROSYN) tablet 500 mg  Thais Huynh, [facility]    02/04/22 1115 02/04/22 1107  traMADol (ULTRAM) tablet 50 mg  ONCE         Emre Leal [facility]              Radiology  XR LUMBAR SPINE (2-3 VIEWS)    Result Date: 2/4/2022  3 views of the lumbar spine, 3 views of the right elbow HISTORY: Pain. FINDINGS: No evidence of an acute fracture in the lumbar spine. The alignment is preserved. The soft tissue structures are clear of an acute abnormality. Within the right elbow the alignment is preserved. There is no acute fracture. No acute findings and lumbar spine or right elbow. XR ELBOW RIGHT (2 VIEWS)    Result Date: 2/4/2022  3 views of the lumbar spine, 3 views of the right elbow HISTORY: Pain. FINDINGS: No evidence of an acute fracture in the lumbar spine. The alignment is preserved. The soft tissue structures are clear of an acute abnormality. Within the right elbow the alignment is preserved. There is no acute fracture. No acute findings and lumbar spine or right elbow. - Patient seen and evaluated in room 1.  23 y.o. female presented for right elbow pain, lower back pain s/p fall. Patient is neurovascularly intact on exam.  Her compartments of the arm soft and have no concern for compartment syndrome. She also has no neurological deficit in the lower extremities. - x-ray did not show acute fracture or dislocation of the right elbow. Lumbar spine x-ray also normal.   - X-ray result discussed with the patient and we agreed to treat symptomatically with NSAID and muscle relaxant.     There is no evidence for more malignant injury/pathology, such as, but not limited to, cauda equina syndrome, acute spinal cord pathology, spinal epidural abscess or mass, abdominal aortic aneurysm, or transverse myelitis. I completed a structured, evidence-based clinical evaluation to screen for acute non-traumatic spinal emergencies. The patient has no red flag symptoms and a normal detailed neurologic exam.  Patient does not have any urinary complaints or abdominal complaints. The evidence indicates that the patient is very low risk for an acute emergency and this is consistent with my clinical intuition. Therefore, it is in the patients best interest not to do additional emergent testing. Patient will be given medications. I do believe the patient is a good candidate for outpatient symptomatic treatment. The patient was instructed on this treatment and the course that this type of back pain typically follows. I also discussed worrisome symptoms to monitor for at home including, but not limited to, numbness or weakness in the lower extremities, bowel or bladder dysfunction, and numbness in the groin. We discussed when symptoms are most concerning that necessitate immediate return. Patient will be discharged with prescriptions, instructions for symptomatic treatment, monitoring and outpatient follow-up with PCP. Patient understands and agrees, return warnings given. I estimate there is LOW risk for ACUTE FRACTURE OR DISLOCATION, COMPARTMENT SYNDROME, DEEP VENOUS THROMBOSIS, SEPTIC ARTHRITIS, TENDON OR NEUROVASCULAR INJURY, thus I consider the discharge disposition reasonable. Merlin Mcardle and I have discussed the diagnosis and risks, and we agree with discharging home to follow-up with PCP. We also discussed returning to the Emergency Department immediately if new or worsening symptoms occur.  We have discussed the symptoms which are most concerning (e.g., changing or worsening pain, numbness, weakness) that necessitate immediate return. Clinical Impression:  1. Contusion of right elbow, initial encounter    2. Strain of lumbar region, initial encounter          Disposition:  Discharge to home in good condition. Blood pressure 122/76, pulse 86, temperature 98.2 °F (36.8 °C), temperature source Oral, resp. rate 10, height 4' 11\" (1.499 m), weight 172 lb 11.2 oz (78.3 kg), last menstrual period 01/31/2022, SpO2 100 %, not currently breastfeeding. Patient was given scripts for the following medications. I counseled patient how to take these medications. New Prescriptions    CYCLOBENZAPRINE (FLEXERIL) 10 MG TABLET    Take 1 tablet by mouth 3 times daily as needed for Muscle spasms    KETOROLAC (TORADOL) 10 MG TABLET    Take 1 tablet by mouth every 8 hours as needed for Pain       Disposition referral (if applicable):  Dharmesh Green  [address] [facility]  [phone]    Schedule an appointment as soon as possible for a visit in 3 days       This chart was generated in part by using Dragon Dictation system and may contain errors related to that system including errors in grammar, punctuation, and spelling, as well as words and phrases that may be inappropriate. If there are any questions or concerns please feel free to contact the dictating provider for clarification.      Rosmery Huitron MD  [address] [place] [address] Esteban Aase, MD  02/04/22 1961

## 2022-04-11 ENCOUNTER — HOSPITAL ENCOUNTER (EMERGENCY)
Age: 27
Discharge: HOME | End: 2022-04-11
Payer: COMMERCIAL

## 2022-04-11 ENCOUNTER — APPOINTMENT (OUTPATIENT)
Dept: GENERAL RADIOLOGY | Age: 27
End: 2022-04-11
Payer: COMMERCIAL

## 2022-04-11 VITALS
WEIGHT: 158 LBS | HEART RATE: 75 BPM | OXYGEN SATURATION: 97 % | BODY MASS INDEX: 31.85 KG/M2 | TEMPERATURE: 98.3 F | RESPIRATION RATE: 16 BRPM | DIASTOLIC BLOOD PRESSURE: 85 MMHG | HEIGHT: 59 IN | SYSTOLIC BLOOD PRESSURE: 123 MMHG

## 2022-04-11 DIAGNOSIS — S80.02XA CONTUSION OF LEFT KNEE, INITIAL ENCOUNTER: Primary | ICD-10-CM
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
## 2022-04-11 DIAGNOSIS — S39.012A LUMBOSACRAL STRAIN, INITIAL ENCOUNTER: ICD-10-CM

## 2022-04-11 PROCEDURE — 99283 EMERGENCY DEPT VISIT LOW MDM: CPT

## 2022-04-11 PROCEDURE — 6360000002 HC RX W HCPCS: Performed by: PHYSICIAN ASSISTANT

## 2022-04-11 PROCEDURE — 96372 THER/PROPH/DIAG INJ SC/IM: CPT

## 2022-04-11 PROCEDURE — 73562 X-RAY EXAM OF KNEE 3: CPT

## 2022-04-11 RX ORDER — NAPROXEN 500 MG/1
500 TABLET ORAL 2 TIMES DAILY PRN
Qty: 20 TABLET | Refills: 0 | Status: SHIPPED | OUTPATIENT
Start: 2022-04-11 | End: 2022-04-21

## 2022-04-11 RX ORDER — KETOROLAC TROMETHAMINE 30 MG/ML
15 INJECTION, SOLUTION INTRAMUSCULAR; INTRAVENOUS ONCE
Status: COMPLETED | OUTPATIENT
Start: 2022-04-11 | End: 2022-04-11

## 2022-04-11 RX ADMIN — KETOROLAC TROMETHAMINE 15 MG: 30 INJECTION, SOLUTION INTRAMUSCULAR; INTRAVENOUS at 12:37

## 2022-04-11 ASSESSMENT — ENCOUNTER SYMPTOMS
COLOR CHANGE: 0
BACK PAIN: 1
RHINORRHEA: 0
COUGH: 0
SORE THROAT: 0
VOMITING: 0
NAUSEA: 0
SHORTNESS OF BREATH: 0
ABDOMINAL PAIN: 0

## 2022-04-11 ASSESSMENT — PAIN SCALES - GENERAL
PAINLEVEL_OUTOF10: 7
PAINLEVEL_OUTOF10: 8

## 2022-04-11 NOTE — Clinical Note
Yuliya Love was seen and treated in our emergency department on 4/11/2022. She may return to work on 04/12/2022. May use crutches as needed for 2 days. If you have any questions or concerns, please don't hesitate to call.       Romona Bumpers, RN

## 2022-04-11 NOTE — ED NOTES
Patient discharged to home with friend. Patient verbalized understanding of discharge instructions. Advised of when to return to ED and when to call 911. Advised of follow up with PCP. Patient received 1 Rx and ACE wrap and Crutches with education. Patient verbalized understanding of education. No questions from patient to this RN. Patient left ED without incident.       Zuleima Lemus RN  04/11/22 1822

## 2022-04-11 NOTE — ED PROVIDER NOTES
[address] ENCOUNTER          PHYSICIAN ASSISTANT NOTE       Date of evaluation: 4/11/2022    Chief Complaint     Fall (fall going up stairs and hit knee, and now feels a tingeling going down her leg, left leg. Pain in her lower back. Denies CP, SOB, V, N. states she has some diarhhea.)      History of Present Illness     Pamela Damian is a 33 y.o. female, with a history of seizures, who presents to the ED with complaints of throbbing left knee pain after she fell while going up steps few hours prior to arrival.  The patient reports having tripped and struck her left knee on the edge of the step. Since then she has been able to walk but reports increased pain with flexion of the knee and with weightbearing. She rates it a 7 out of 10 in severity, unrelieved with ibuprofen that she took prior to arrival.  She also reports an achy discomfort in her left low back and describes tingling down the left leg but no weakness, no bowel or bladder dysfunction. She did not sustain an impact to the back but rather fell forward. She denies head injury, otherwise has no complaints. Review of Systems     Review of Systems   Constitutional: Negative for chills and fever. HENT: Negative for congestion, rhinorrhea and sore throat. Respiratory: Negative for cough and shortness of breath. Cardiovascular: Negative for chest pain and palpitations. Gastrointestinal: Negative for abdominal pain, nausea and vomiting. Genitourinary: Negative for decreased urine volume and difficulty urinating. Musculoskeletal: Positive for arthralgias (left knee), back pain (left lower) and gait problem (pain). Negative for joint swelling and neck pain. Skin: Negative for color change and wound. Neurological: Negative for dizziness, weakness, light-headedness, numbness and headaches. All other systems reviewed and are negative.       Past Medical, Surgical, Family, and Social History     She has a past medical history of Seizures ([facility] [address] She has no past surgical history on file. Her family history is not on file. She reports that she has been smoking cigarettes. She has a 1.50 pack-year smoking history. She has never used smokeless tobacco. She reports current alcohol use. She reports that she does not use drugs. Medications     Previous Medications    No medications on file       Allergies     She is allergic to reglan [metoclopramide] and zofran [ondansetron hcl]. Physical Exam     INITIAL VITALS: BP: 123/85, Temp: 98.3 °F (36.8 °C), Pulse: 75, Resp: 16, SpO2: 97 %  Physical Exam  Vitals reviewed. Constitutional:       General: She is not in acute distress. Appearance: She is well-developed and normal weight. HENT:      Head: Normocephalic and atraumatic. Mouth/Throat:      Mouth: Mucous membranes are moist.   Eyes:      Extraocular Movements: Extraocular movements intact. Conjunctiva/sclera: Conjunctivae normal.   Neck:      Trachea: Phonation normal.   Cardiovascular:      Rate and Rhythm: Normal rate and regular rhythm. Heart sounds: No murmur heard. No friction rub. No gallop. Pulmonary:      Effort: Pulmonary effort is normal. No respiratory distress. Breath sounds: No wheezing, rhonchi or rales. Musculoskeletal:      Cervical back: Normal range of motion and neck supple. Lumbar back: Tenderness (left paralumbar musculature) present. No swelling, deformity, spasms or bony tenderness. Normal range of motion. Left knee: Bony tenderness (generalized) present. No swelling, deformity, ecchymosis or crepitus. Decreased range of motion (Flexes to approximately 90 degrees, limited by pain. ). No LCL laxity, MCL laxity, ACL laxity or PCL laxity. Skin:     General: Skin is warm. Findings: No abrasion, bruising, petechiae or rash. Neurological:      Mental Status: She is alert and oriented to person, place, and time. Sensory: Sensation is intact. Motor: Motor function is intact. Psychiatric:         Mood and Affect: Mood and affect normal.         Behavior: Behavior normal.         ED Course     Nursing Notes, Past Medical Hx,Past Surgical Hx, Social Hx, Allergies, and Family Hx were reviewed. The patient was given the following medications:  Orders Placed This Encounter   Medications    ketorolac (TORADOL) injection 15 mg    naproxen (NAPROSYN) 500 MG tablet     Sig: Take 1 tablet by mouth 2 times daily as needed for Pain Take with milk or food. Dispense:  20 tablet     Refill:  0       CONSULTS:  None    MEDICAL DECISION MAKING / ASSESSMENT / Nabila Trang is a 33 y.o. female presenting with left knee pain and limited range of motion after she tripped and fell up a step and struck it on the edge of the step several hours prior to arrival.  She also reports pain in the left low back and describes tingling down the leg but has no neurologic deficits, a negative straight leg raise and is able to ambulate with a limp favoring the left leg due to pain in her knee. She is given IM Toradol for pain after she confirm a normal period within the last month and denies possibility of pregnancy. X-ray of the left knee showed no acute findings. The patient likely suffered a lumbar strain due to the jarring impact of the fall, has no neurologic deficits, I do not feel that imaging of the spine is indicated at this time. She has evidence of contusion of the knee given her tenderness but no other acute findings on exam and no bony injury on x-ray. She is placed in Ace wrap for support and comfort. Is also provided crutches in order to assist her with weightbearing over the next couple of days. Is prescribed naproxen for pain. She will follow-up with her primary care provider for recheck in 3 to 4 days if no improvement or return to the ED for any worsening symptoms. Clinical Impression     1.  Contusion of left knee, initial encounter    2. Fall from slip, trip, or stumble, initial encounter    3. Lumbosacral strain, initial encounter        Disposition     PATIENT REFERRED TO:  Kim Vail  [address]  [phone]    Call in 3 days  if no improvement      DISCHARGE MEDICATIONS:  Discharge Medication List as of 4/11/2022  1:06 PM      START taking these medications    Details   naproxen (NAPROSYN) 500 MG tablet Take 1 tablet by mouth 2 times daily as needed for Pain Take with milk or food. , Disp-20 tablet, R-[id number]             DISPOSITION  Discharged     [facility], [address]  04/11/22 1322

## 2023-01-17 ENCOUNTER — HOSPITAL ENCOUNTER (EMERGENCY)
Age: 28
Discharge: HOME | End: 2023-01-17
Attending: EMERGENCY MEDICINE
Payer: COMMERCIAL

## 2023-01-17 ENCOUNTER — APPOINTMENT (OUTPATIENT)
Dept: CT IMAGING | Age: 28
End: 2023-01-17
Payer: COMMERCIAL

## 2023-01-17 VITALS
HEART RATE: 69 BPM | BODY MASS INDEX: 31.65 KG/M2 | HEIGHT: 59 IN | TEMPERATURE: 98.7 F | RESPIRATION RATE: 14 BRPM | WEIGHT: 157 LBS | SYSTOLIC BLOOD PRESSURE: 140 MMHG | OXYGEN SATURATION: 100 % | DIASTOLIC BLOOD PRESSURE: 96 MMHG

## 2023-01-17 DIAGNOSIS — N20.0 KIDNEY STONE: Primary | ICD-10-CM

## 2023-01-17 LAB
A/G RATIO: 1.6 (ref 1.1–2.2)
ALBUMIN SERPL-MCNC: 4.5 G/DL (ref 3.4–5)
ALP BLD-CCNC: 56 U/L (ref 40–129)
ALT SERPL-CCNC: 10 U/L (ref 10–40)
ANION GAP SERPL CALCULATED.3IONS-SCNC: 10 MMOL/L (ref 3–16)
AST SERPL-CCNC: 17 U/L (ref 15–37)
BASOPHILS ABSOLUTE: 0 K/UL (ref 0–0.2)
BASOPHILS RELATIVE PERCENT: 0.4 %
BILIRUB SERPL-MCNC: 1.2 MG/DL (ref 0–1)
BILIRUBIN URINE: ABNORMAL
BLOOD, URINE: ABNORMAL
BUN BLDV-MCNC: 11 MG/DL (ref 7–20)
CALCIUM SERPL-MCNC: 9.3 MG/DL (ref 8.3–10.6)
CHLORIDE BLD-SCNC: 107 MMOL/L (ref 99–110)
CLARITY: ABNORMAL
CO2: 26 MMOL/L (ref 21–32)
COLOR: ABNORMAL
CREAT SERPL-MCNC: 0.9 MG/DL (ref 0.6–1.1)
EOSINOPHILS ABSOLUTE: 0.1 K/UL (ref 0–0.6)
EOSINOPHILS RELATIVE PERCENT: 1 %
GFR SERPL CREATININE-BSD FRML MDRD: >60 ML/MIN/{1.73_M2}
GLUCOSE BLD-MCNC: 120 MG/DL (ref 70–99)
GLUCOSE URINE: NEGATIVE MG/DL
HCG(URINE) PREGNANCY TEST: NEGATIVE
HCT VFR BLD CALC: 42.2 % (ref 36–48)
HEMOGLOBIN: 14.4 G/DL (ref 12–16)
KETONES, URINE: ABNORMAL MG/DL
LEUKOCYTE ESTERASE, URINE: ABNORMAL
LIPASE: 73 U/L (ref 13–60)
LYMPHOCYTES ABSOLUTE: 2.3 K/UL (ref 1–5.1)
LYMPHOCYTES RELATIVE PERCENT: 29.5 %
MCH RBC QN AUTO: 31.8 PG (ref 26–34)
MCHC RBC AUTO-ENTMCNC: 34.2 G/DL (ref 31–36)
MCV RBC AUTO: 92.9 FL (ref 80–100)
MICROSCOPIC EXAMINATION: YES
MONOCYTES ABSOLUTE: 0.5 K/UL (ref 0–1.3)
MONOCYTES RELATIVE PERCENT: 6.6 %
NEUTROPHILS ABSOLUTE: 4.8 K/UL (ref 1.7–7.7)
NEUTROPHILS RELATIVE PERCENT: 62.5 %
NITRITE, URINE: NEGATIVE
PDW BLD-RTO: 13.5 % (ref 12.4–15.4)
PH UA: 6 (ref 5–8)
PLATELET # BLD: 250 K/UL (ref 135–450)
PMV BLD AUTO: 7.9 FL (ref 5–10.5)
POTASSIUM REFLEX MAGNESIUM: 3.9 MMOL/L (ref 3.5–5.1)
PROTEIN UA: 100 MG/DL
RBC # BLD: 4.55 M/UL (ref 4–5.2)
RBC UA: >100 /HPF (ref 0–4)
SODIUM BLD-SCNC: 143 MMOL/L (ref 136–145)
SPECIFIC GRAVITY UA: >=1.03 (ref 1–1.03)
TOTAL PROTEIN: 7.4 G/DL (ref 6.4–8.2)
TRICHOMONAS: PRESENT /HPF
URINE TYPE: ABNORMAL
UROBILINOGEN, URINE: 0.2 E.U./DL
WBC # BLD: 7.7 K/UL (ref 4–11)
WBC UA: ABNORMAL /HPF (ref 0–5)

## 2023-01-17 PROCEDURE — 80053 COMPREHEN METABOLIC PANEL: CPT

## 2023-01-17 PROCEDURE — 6360000002 HC RX W HCPCS: Performed by: EMERGENCY MEDICINE

## 2023-01-17 PROCEDURE — 81001 URINALYSIS AUTO W/SCOPE: CPT

## 2023-01-17 PROCEDURE — 96375 TX/PRO/DX INJ NEW DRUG ADDON: CPT

## 2023-01-17 PROCEDURE — 74177 CT ABD & PELVIS W/CONTRAST: CPT

## 2023-01-17 PROCEDURE — 99285 EMERGENCY DEPT VISIT HI MDM: CPT

## 2023-01-17 PROCEDURE — 96374 THER/PROPH/DIAG INJ IV PUSH: CPT

## 2023-01-17 PROCEDURE — 85025 COMPLETE CBC W/AUTO DIFF WBC: CPT

## 2023-01-17 PROCEDURE — 83690 ASSAY OF LIPASE: CPT

## 2023-01-17 PROCEDURE — 6360000004 HC RX CONTRAST MEDICATION: Performed by: EMERGENCY MEDICINE

## 2023-01-17 PROCEDURE — 84703 CHORIONIC GONADOTROPIN ASSAY: CPT

## 2023-01-17 RX ORDER — OXYCODONE HYDROCHLORIDE AND ACETAMINOPHEN 5; 325 MG/1; MG/1
1-2 TABLET ORAL EVERY 6 HOURS PRN
Qty: 8 TABLET | Refills: 0 | Status: SHIPPED | OUTPATIENT
Start: 2023-01-17 | End: 2023-01-24

## 2023-01-17 RX ORDER — LORAZEPAM 2 MG/ML
1 INJECTION INTRAMUSCULAR ONCE
Status: COMPLETED | OUTPATIENT
Start: 2023-01-17 | End: 2023-01-17

## 2023-01-17 RX ORDER — NAPROXEN 500 MG/1
500 TABLET ORAL 2 TIMES DAILY
Qty: 20 TABLET | Refills: 0 | Status: SHIPPED | OUTPATIENT
Start: 2023-01-17 | End: 2023-01-27

## 2023-01-17 RX ORDER — KETOROLAC TROMETHAMINE 30 MG/ML
30 INJECTION, SOLUTION INTRAMUSCULAR; INTRAVENOUS ONCE
Status: COMPLETED | OUTPATIENT
Start: 2023-01-17 | End: 2023-01-17

## 2023-01-17 RX ORDER — PROMETHAZINE HYDROCHLORIDE 25 MG/1
25 TABLET ORAL EVERY 6 HOURS PRN
Qty: 20 TABLET | Refills: 0 | Status: SHIPPED | OUTPATIENT
Start: 2023-01-17 | End: 2023-01-24

## 2023-01-17 RX ADMIN — KETOROLAC TROMETHAMINE 30 MG: 30 INJECTION, SOLUTION INTRAMUSCULAR at 19:19

## 2023-01-17 RX ADMIN — LORAZEPAM 1 MG: 2 INJECTION INTRAMUSCULAR; INTRAVENOUS at 19:22

## 2023-01-17 RX ADMIN — IOPAMIDOL 75 ML: 755 INJECTION, SOLUTION INTRAVENOUS at 19:39

## 2023-01-17 ASSESSMENT — PAIN DESCRIPTION - FREQUENCY: FREQUENCY: CONTINUOUS

## 2023-01-17 ASSESSMENT — PAIN SCALES - GENERAL
PAINLEVEL_OUTOF10: 10
PAINLEVEL_OUTOF10: 10
PAINLEVEL_OUTOF10: 5

## 2023-01-17 ASSESSMENT — PAIN DESCRIPTION - DESCRIPTORS: DESCRIPTORS: ACHING

## 2023-01-17 ASSESSMENT — PAIN DESCRIPTION - LOCATION: LOCATION: BACK;FLANK

## 2023-01-17 ASSESSMENT — PAIN DESCRIPTION - ORIENTATION: ORIENTATION: LEFT

## 2023-01-17 ASSESSMENT — PAIN - FUNCTIONAL ASSESSMENT: PAIN_FUNCTIONAL_ASSESSMENT: 0-10

## 2023-01-17 ASSESSMENT — PAIN DESCRIPTION - PAIN TYPE: TYPE: ACUTE PAIN

## 2023-01-17 NOTE — ED NOTES
Patient to ed per [facility] with left back pain and left flank pain which started suddenly around 4 pm and worsened, patient has nausea and vomiting with increased blood in her urine.      Galileo Mack RN  01/17/23 2349

## 2023-01-18 NOTE — ED PROVIDER NOTES
[address] [facility]  eMERGENCY dEPARTMENT eNCOUnter      Pt Name: Nick Hammer  MRN: [id number]  [place] 1995  Date of evaluation: 1/17/2023  Provider: Aric Valero MD  PCP: [address]       Chief Complaint   Patient presents with    Back Pain     left    Flank Pain     left       HISTORY OFPRESENT ILLNESS   (Location/Symptom, Timing/Onset, Context/Setting, Quality, Duration, Modifying Factors,Severity)  Note limiting factors. Nick Hammer is a 33 y.o. female presents with left flank and back pain that is associated with some vomiting she is never had pain like this before she denies any vaginal bleeding or vaginal discharge she denies any epigastric or right upper quadrant pain. Denies any hematuria or dysuria. Rates the pain at a 10 out of 10    Nursing Notes were all reviewed and agreed with or any disagreements were addressed  in the HPI. REVIEW OF SYSTEMS    (2-9 systems for level 4, 10 or more for level 5)     Review of Systems    Positives and Pertinent negatives as per HPI. Except as noted above in the ROS, all other systems were reviewed andnegative. PASTMEDICAL HISTORY     Past Medical History:   Diagnosis Date    Seizures ([facility] [address]          SURGICAL HISTORY     History reviewed. No pertinent surgical history. CURRENT MEDICATIONS       Previous Medications    No medications on file       ALLERGIES     Reglan [metoclopramide] and Zofran [ondansetron hcl]    FAMILY HISTORY     History reviewed. No pertinent family history.        SOCIAL HISTORY       Social History     Socioeconomic History    Marital status: Single     Spouse name: None    Number of children: None    Years of education: None    Highest education level: None   Tobacco Use    Smoking status: Every Day     Packs/day: 0.50     Years: 3.00     Pack years: 1.50     Types: Cigarettes    Smokeless tobacco: Never   Vaping Use    Vaping Use: Never used Substance and Sexual Activity    Alcohol use: Yes     Comment: social     Drug use: Not Currently    Sexual activity: Yes     Partners: Female       SCREENINGS    Julio Coma Scale  Eye Opening: Spontaneous  Best Verbal Response: Oriented  Best Motor Response: Obeys commands  [place] Coma Scale Score: 15 @FLOW([id number])@      PHYSICAL EXAM    (up to 7 for level 4, 8 or more for level 5)     ED Triage Vitals [01/17/23 1817]   BP Temp Temp src Heart Rate Resp SpO2 Height Weight   (!) 140/96 98.7 °F (37.1 °C) -- 69 14 100 % 4' 11\" (1.499 m) 157 lb (71.2 kg)       Physical Exam      General Appearance:  Alert, cooperative, no distress, appears stated age. Head:  Normocephalic, without obviousabnormality, atraumatic. Eyes:  conjunctiva/corneas clear, EOM's intact. Sclera anicteric. ENT: Mucous membranes moist.   Neck: Supple, symmetrical, trachea midline, no adenopathy. No jugular venous distention. Lungs:   Clear to auscultation bilaterally, respirationsunlabored. No rales, rhonchi or wheezes. Chest Wall:  No tenderness. Heart:  Regular rate and rhythm, S1 and S2 normal, no murmur, rub or gallop. Abdomen:   Soft, non-tender, bowel sounds active,   no masses, no organomegaly. No CVA tenderness   Extremities: No edema, cords or calf tenderness. Full range of motion. Pulses: 2+ and symmetric   Skin: Turgor is normal, no rashes or lesions. Neurologic: Alert and oriented X 3. No focal findings.   Motor grossly normal.  Speech clear, no drift, CN III-XII grossly intact,        DIAGNOSTIC RESULTS   LABS:    Labs Reviewed   COMPREHENSIVE METABOLIC PANEL W/ REFLEX TO MG FOR LOW K - Abnormal; Notable for the following components:       Result Value    Glucose 120 (*)     Total Bilirubin 1.2 (*)     All other components within normal limits   LIPASE - Abnormal; Notable for the following components:    Lipase 73.0 (*)     All other components within normal limits   URINALYSIS - Abnormal; Notable for the following components:    Color, UA RED (*)     Clarity, UA CLOUDY (*)     Bilirubin Urine SMALL (*)     Ketones, Urine TRACE (*)     Blood, Urine LARGE (*)     Protein,  (*)     Leukocyte Esterase, Urine TRACE (*)     All other components within normal limits   MICROSCOPIC URINALYSIS - Abnormal; Notable for the following components:    RBC, UA >100 (*)     Trichomonas, UA Present (*)     All other components within normal limits   CBC WITH AUTO DIFFERENTIAL   PREGNANCY, URINE       All other labs were within normal range or not returned as of this dictation. EKG: All EKG's are interpreted by the Emergency Department Physician who eithersigns or Co-signs this chart in the absence of a cardiologist.        RADIOLOGY:   Non-plain film images such as CT, Ultrasound and MRI are read by the radiologist. Plain radiographic images are visualized by myself. *    Interpretation per the Radiologist below, if available at the time of this note:    CT ABDOMEN PELVIS W IV CONTRAST Additional Contrast? None   Final Result      1.  4 mm obstructing calculus in the mid left ureter resulting in mild left hydronephrosis and hydroureter. PROCEDURES   Unless otherwise noted below, none     Procedures    *    CRITICAL CARE TIME   N/A      EMERGENCY DEPARTMENT COURSE and DIFFERENTIALDIAGNOSIS/MDM:   Vitals:    Vitals:    01/17/23 1817   BP: (!) 140/96   Pulse: 69   Resp: 14   Temp: 98.7 °F (37.1 °C)   SpO2: 100%   Weight: 157 lb (71.2 kg)   Height: 4' 11\" (1.499 m)       Patient was given thefollowing medications:  Medications   ketorolac (TORADOL) injection 30 mg (30 mg IntraVENous Given 1/17/23 1919)   LORazepam (ATIVAN) injection 1 mg (1 mg IntraVENous Given 1/17/23 1922)   iopamidol (ISOVUE-370) 76 % injection 75 mL (75 mLs IntraVENous Given 1/17/23 1939)           The patient tolerated their visit well.    The patient and / or the familywere informed of the results of any tests, a time was given to answer questions. FINAL IMPRESSION      1. Kidney stone          DISPOSITION/PLAN   DISPOSITION Decision To Discharge 01/17/2023 08:20:16 PM  Patient received relief and was sleeping comfortably plan will be for discharge for management as an outpatient follow-up with urology she will be  given pain medication and nausea medication for discharge told return if worse    PATIENT REFERRED TO:  [facility]  [address] [facility]  [phone]  In 2 days      DISCHARGE MEDICATIONS:  New Prescriptions    NAPROXEN (NAPROSYN) 500 MG TABLET    Take 1 tablet by mouth 2 times daily for 20 doses    OXYCODONE-ACETAMINOPHEN (PERCOCET) 5-325 MG PER TABLET    Take 1-2 tablets by mouth every 6 hours as needed for Pain for up to 7 days. Max Daily Amount: 8 tablets    PROMETHAZINE (PHENERGAN) 25 MG TABLET    Take 1 tablet by mouth every 6 hours as needed for Nausea WARNING:  May cause drowsiness. May impair ability to operate vehicles or machinery. Do not use in combination with alcohol. DISCONTINUED MEDICATIONS:  Discontinued Medications    NAPROXEN (NAPROSYN) 500 MG TABLET    Take 1 tablet by mouth 2 times daily as needed for Pain Take with milk or food.               (Please note that portions of this note were completed with a voice recognition program.  Efforts were made to edit the dictations but occasionally words are mis-transcribed.)    Sincere Dickinson MD (electronically signed)       Sincere Dickinson MD  01/17/23 2024

## 2023-01-18 NOTE — ED NOTES
Pt dc/d with instructions and rx's, hat and strainer for urine, ambulatory to lobby. Home  Per ride.      Ethyl Sever, RN  01/17/23 2037

## 2023-01-18 NOTE — DISCHARGE INSTRUCTIONS
Discharge home  Strain urine for stones  Naprosyn for pain  Percocet for breakthrough pain  Phenergan for nausea  Drink plenty of fluids  Follow-up with the urology group

## 2023-02-02 NOTE — PROGRESS NOTES
Name_______________________________________Printed:____________________  Date and time of surgery__2/8/2023_______0830_______________Arrival Time:____0700____________   1. The instructions given regarding when and if a patient needs to stop oral intake prior to surgery varies. Follow the specific instructions you were given                  _x__Nothing to eat or to drink after Midnight the night before.                   ____Carbo loading or instructions will be given to select patients-if you have been given those instructions -please do the following                           The evening before your surgery after dinner before midnight drink 40 ounces of gatorade. If you are diabetic use sugar free. The morning of surgery drink 40 ounces of water. This needs to be finished 3 hours prior to your surgery start time. 2. Take the following pills with a small sip of water on the morning of surgery___________________________________________________                  Do not take blood pressure medications ending in pril or sartan the brigido prior to surgery or the morning of surgery. Dr Mala Hightower patient are not to take any medications the AM of surgery. 3. Aspirin, Ibuprofen, Advil, Naproxen, Vitamin E and other Anti-inflammatory products and supplements should be stopped for 5 -7days before surgery or as directed by your physician. 4. Check with your Doctor regarding stopping Plavix, Coumadin,Eliquis, Lovenox,Effient,Pradaxa,Xarelto, Fragmin or other blood thinners and follow their instructions. 5. Do not smoke, and do not drink any alcoholic beverages 24 hours prior to surgery. This includes NA Beer. Refrain from the usage of any recreational drugs. 6. You may brush your teeth and gargle the morning of surgery. DO NOT SWALLOW WATER   7. You MUST make arrangements for a responsible adult to stay on site while you are here and take you home after your surgery.  You will not be allowed to leave alone or drive yourself home. It is strongly suggested someone stay with you the first 24 hrs. Your surgery will be cancelled if you do not have a ride home. 8. A parent/legal guardian must accompany a child scheduled for surgery and plan to stay at the hospital until the child is discharged. Please do not bring other children with you. 9. Please wear simple, loose fitting clothing to the hospital.  [place] not bring valuables (money, credit cards, checkbooks, etc.) Do not wear any makeup (including no eye makeup) or nail polish on your fingers or toes. 10. DO NOT wear any jewelry or piercings on day of surgery. All body piercing jewelry must be removed. 11. If you have ___dentures, they will be removed before going to the OR; we will provide you a container. If you wear ___contact lenses or ___glasses, they will be removed; please bring a case for them. 12. Please see your family doctor/pediatrician for a history & physical and/or concerning medications. Bring any test results/reports from your physician's office. PCP__________________Phone___________H&P Appt. Date________             13 If you  have a Living Will and Durable Power of  for Healthcare, please bring in a copy. 15. Notify your Surgeon if you develop any illness between now and surgery  time, cough, cold, fever, sore throat, nausea, vomiting, etc.  Please notify your surgeon if you experience dizziness, shortness of breath or blurred vision between now & the time of your surgery             15. DO NOT shave your operative site 96 hours prior to surgery. For face & neck surgery, men may use an electric razor 48 hours prior to surgery. 16. Shower the night before or morning of surgery using an antibacterial soap or as you have been instructed. 17. To provide excellent care visitors will be limited to one in the room at any given time.              18.  Please bring picture ID and insurance card. 19.  Visit our web site for additional information:  [email or web address]              20.During flu season no children under the age of 15 are permitted in the hospital for the safety of all patients. 21. If you take a long acting insulin in the evening only  take half of your usual  dose the night  before your procedure              22. If you use a c-pap please bring DOS if staying overnight,             23.For your convenience [facility] has a pharmacy on site to fill your prescriptions. 24. If you use oxygen and have a portable tank please bring it  with you the DOS             25. Bring a complete list of all your medications with name and dose include any supplements. 26. Other__________________________________________   *Please call pre admission testing if you any further questions   [facility]   [address] [place] [address]. [place]  [phone]   [address] [place] [address]       VISITOR POLICY(subject to change)    Current policy is 2 visitors per patient. No children. Mask is  at the discretion of the facility. Visiting hours are 8a-8p. Overnight visitors will be at the discretion of the nurse. All policies subject to change. All above information reviewed with patient in person or by phone. Patient verbalizes understanding. All questions and concerns addressed.                                                                                                  Patient/Rep__patient__________________                                                                                                                                    PRE OP INSTRUCTIONS

## 2023-02-08 ENCOUNTER — HOSPITAL ENCOUNTER (OUTPATIENT)
Age: 28
Setting detail: OUTPATIENT SURGERY
Discharge: HOME | End: 2023-02-08
Attending: UROLOGY | Admitting: UROLOGY
Payer: COMMERCIAL

## 2023-02-08 ENCOUNTER — ANESTHESIA (OUTPATIENT)
Dept: OPERATING ROOM | Age: 28
End: 2023-02-08
Payer: COMMERCIAL

## 2023-02-08 ENCOUNTER — ANESTHESIA EVENT (OUTPATIENT)
Dept: OPERATING ROOM | Age: 28
End: 2023-02-08
Payer: COMMERCIAL

## 2023-02-08 ENCOUNTER — APPOINTMENT (OUTPATIENT)
Dept: GENERAL RADIOLOGY | Age: 28
End: 2023-02-08
Attending: UROLOGY
Payer: COMMERCIAL

## 2023-02-08 VITALS
BODY MASS INDEX: 32.11 KG/M2 | OXYGEN SATURATION: 97 % | HEART RATE: 80 BPM | SYSTOLIC BLOOD PRESSURE: 105 MMHG | WEIGHT: 159 LBS | DIASTOLIC BLOOD PRESSURE: 67 MMHG | TEMPERATURE: 97.3 F | RESPIRATION RATE: 10 BRPM

## 2023-02-08 DIAGNOSIS — N20.1 CALCULUS OF URETER: Primary | ICD-10-CM

## 2023-02-08 LAB — HCG(URINE) PREGNANCY TEST: NEGATIVE

## 2023-02-08 PROCEDURE — 6360000002 HC RX W HCPCS: Performed by: UROLOGY

## 2023-02-08 PROCEDURE — 7100000001 HC PACU RECOVERY - ADDTL 15 MIN: Performed by: UROLOGY

## 2023-02-08 PROCEDURE — 3600000014 HC SURGERY LEVEL 4 ADDTL 15MIN: Performed by: UROLOGY

## 2023-02-08 PROCEDURE — 6360000004 HC RX CONTRAST MEDICATION: Performed by: UROLOGY

## 2023-02-08 PROCEDURE — 6360000002 HC RX W HCPCS: Performed by: NURSE ANESTHETIST, CERTIFIED REGISTERED

## 2023-02-08 PROCEDURE — 3700000000 HC ANESTHESIA ATTENDED CARE: Performed by: UROLOGY

## 2023-02-08 PROCEDURE — 7100000010 HC PHASE II RECOVERY - FIRST 15 MIN: Performed by: UROLOGY

## 2023-02-08 PROCEDURE — 2580000003 HC RX 258: Performed by: UROLOGY

## 2023-02-08 PROCEDURE — 3700000001 HC ADD 15 MINUTES (ANESTHESIA): Performed by: UROLOGY

## 2023-02-08 PROCEDURE — 6370000000 HC RX 637 (ALT 250 FOR IP): Performed by: ANESTHESIOLOGY

## 2023-02-08 PROCEDURE — C1769 GUIDE WIRE: HCPCS | Performed by: UROLOGY

## 2023-02-08 PROCEDURE — 84703 CHORIONIC GONADOTROPIN ASSAY: CPT

## 2023-02-08 PROCEDURE — 7100000011 HC PHASE II RECOVERY - ADDTL 15 MIN: Performed by: UROLOGY

## 2023-02-08 PROCEDURE — 6360000002 HC RX W HCPCS

## 2023-02-08 PROCEDURE — 2709999900 HC NON-CHARGEABLE SUPPLY: Performed by: UROLOGY

## 2023-02-08 PROCEDURE — 3600000004 HC SURGERY LEVEL 4 BASE: Performed by: UROLOGY

## 2023-02-08 PROCEDURE — 74420 UROGRAPHY RTRGR +-KUB: CPT

## 2023-02-08 PROCEDURE — 2500000003 HC RX 250 WO HCPCS: Performed by: NURSE ANESTHETIST, CERTIFIED REGISTERED

## 2023-02-08 PROCEDURE — 7100000000 HC PACU RECOVERY - FIRST 15 MIN: Performed by: UROLOGY

## 2023-02-08 PROCEDURE — 6360000002 HC RX W HCPCS: Performed by: ANESTHESIOLOGY

## 2023-02-08 RX ORDER — HYDROCODONE BITARTRATE AND ACETAMINOPHEN 5; 325 MG/1; MG/1
1 TABLET ORAL ONCE
Status: COMPLETED | OUTPATIENT
Start: 2023-02-08 | End: 2023-02-08

## 2023-02-08 RX ORDER — OXYBUTYNIN CHLORIDE 5 MG/1
5 TABLET ORAL 3 TIMES DAILY PRN
Qty: 21 TABLET | Refills: 0 | Status: SHIPPED | OUTPATIENT
Start: 2023-02-08 | End: 2023-02-15

## 2023-02-08 RX ORDER — SUCCINYLCHOLINE CHLORIDE 20 MG/ML
INJECTION INTRAMUSCULAR; INTRAVENOUS PRN
Status: DISCONTINUED | OUTPATIENT
Start: 2023-02-08 | End: 2023-02-08 | Stop reason: SDUPTHER

## 2023-02-08 RX ORDER — SODIUM CHLORIDE 9 MG/ML
INJECTION, SOLUTION INTRAVENOUS PRN
Status: DISCONTINUED | OUTPATIENT
Start: 2023-02-08 | End: 2023-02-08 | Stop reason: HOSPADM

## 2023-02-08 RX ORDER — ONDANSETRON 2 MG/ML
4 INJECTION INTRAMUSCULAR; INTRAVENOUS
Status: DISCONTINUED | OUTPATIENT
Start: 2023-02-08 | End: 2023-02-08 | Stop reason: HOSPADM

## 2023-02-08 RX ORDER — MAGNESIUM HYDROXIDE 1200 MG/15ML
LIQUID ORAL
Status: COMPLETED | OUTPATIENT
Start: 2023-02-08 | End: 2023-02-08

## 2023-02-08 RX ORDER — PROPOFOL 10 MG/ML
INJECTION, EMULSION INTRAVENOUS PRN
Status: DISCONTINUED | OUTPATIENT
Start: 2023-02-08 | End: 2023-02-08 | Stop reason: SDUPTHER

## 2023-02-08 RX ORDER — SODIUM CHLORIDE 0.9 % (FLUSH) 0.9 %
5-40 SYRINGE (ML) INJECTION PRN
Status: DISCONTINUED | OUTPATIENT
Start: 2023-02-08 | End: 2023-02-08 | Stop reason: HOSPADM

## 2023-02-08 RX ORDER — HYDROCODONE BITARTRATE AND ACETAMINOPHEN 5; 325 MG/1; MG/1
1 TABLET ORAL EVERY 6 HOURS PRN
Qty: 20 TABLET | Refills: 0 | Status: SHIPPED | OUTPATIENT
Start: 2023-02-08 | End: 2023-02-13

## 2023-02-08 RX ORDER — AMOXICILLIN 250 MG
1 CAPSULE ORAL 2 TIMES DAILY
Qty: 30 TABLET | Refills: 1 | Status: SHIPPED | OUTPATIENT
Start: 2023-02-08 | End: 2023-03-10

## 2023-02-08 RX ORDER — TAMSULOSIN HYDROCHLORIDE 0.4 MG/1
0.4 CAPSULE ORAL DAILY
Qty: 30 CAPSULE | Refills: 0 | Status: SHIPPED | OUTPATIENT
Start: 2023-02-08 | End: 2023-03-10

## 2023-02-08 RX ORDER — LEVOFLOXACIN 5 MG/ML
500 INJECTION, SOLUTION INTRAVENOUS EVERY 24 HOURS
Status: DISCONTINUED | OUTPATIENT
Start: 2023-02-08 | End: 2023-02-08 | Stop reason: HOSPADM

## 2023-02-08 RX ORDER — FENTANYL CITRATE 50 UG/ML
INJECTION, SOLUTION INTRAMUSCULAR; INTRAVENOUS PRN
Status: DISCONTINUED | OUTPATIENT
Start: 2023-02-08 | End: 2023-02-08 | Stop reason: SDUPTHER

## 2023-02-08 RX ORDER — IODIXANOL 320 MG/ML
INJECTION, SOLUTION INTRAVASCULAR
Status: COMPLETED | OUTPATIENT
Start: 2023-02-08 | End: 2023-02-08

## 2023-02-08 RX ORDER — DIPHENHYDRAMINE HYDROCHLORIDE 50 MG/ML
12.5 INJECTION INTRAMUSCULAR; INTRAVENOUS
Status: DISCONTINUED | OUTPATIENT
Start: 2023-02-08 | End: 2023-02-08 | Stop reason: HOSPADM

## 2023-02-08 RX ORDER — HYDROMORPHONE HCL 110MG/55ML
0.5 PATIENT CONTROLLED ANALGESIA SYRINGE INTRAVENOUS EVERY 5 MIN PRN
Status: DISCONTINUED | OUTPATIENT
Start: 2023-02-08 | End: 2023-02-08 | Stop reason: HOSPADM

## 2023-02-08 RX ORDER — ROCURONIUM BROMIDE 10 MG/ML
INJECTION, SOLUTION INTRAVENOUS PRN
Status: DISCONTINUED | OUTPATIENT
Start: 2023-02-08 | End: 2023-02-08 | Stop reason: SDUPTHER

## 2023-02-08 RX ORDER — MEPERIDINE HYDROCHLORIDE 25 MG/ML
12.5 INJECTION INTRAMUSCULAR; INTRAVENOUS; SUBCUTANEOUS EVERY 5 MIN PRN
Status: DISCONTINUED | OUTPATIENT
Start: 2023-02-08 | End: 2023-02-08 | Stop reason: HOSPADM

## 2023-02-08 RX ORDER — SODIUM CHLORIDE 0.9 % (FLUSH) 0.9 %
5-40 SYRINGE (ML) INJECTION EVERY 12 HOURS SCHEDULED
Status: DISCONTINUED | OUTPATIENT
Start: 2023-02-08 | End: 2023-02-08 | Stop reason: HOSPADM

## 2023-02-08 RX ORDER — SODIUM CHLORIDE, SODIUM LACTATE, POTASSIUM CHLORIDE, CALCIUM CHLORIDE 600; 310; 30; 20 MG/100ML; MG/100ML; MG/100ML; MG/100ML
INJECTION, SOLUTION INTRAVENOUS CONTINUOUS
Status: DISCONTINUED | OUTPATIENT
Start: 2023-02-08 | End: 2023-02-08 | Stop reason: HOSPADM

## 2023-02-08 RX ORDER — PROCHLORPERAZINE EDISYLATE 5 MG/ML
5 INJECTION INTRAMUSCULAR; INTRAVENOUS ONCE
Status: COMPLETED | OUTPATIENT
Start: 2023-02-08 | End: 2023-02-08

## 2023-02-08 RX ORDER — LIDOCAINE HYDROCHLORIDE 10 MG/ML
0.5 INJECTION, SOLUTION EPIDURAL; INFILTRATION; INTRACAUDAL; PERINEURAL ONCE
Status: DISCONTINUED | OUTPATIENT
Start: 2023-02-08 | End: 2023-02-08 | Stop reason: HOSPADM

## 2023-02-08 RX ORDER — PHENAZOPYRIDINE HYDROCHLORIDE 100 MG/1
200 TABLET, FILM COATED ORAL 3 TIMES DAILY PRN
Qty: 9 TABLET | Refills: 0 | Status: SHIPPED | OUTPATIENT
Start: 2023-02-08 | End: 2023-02-11

## 2023-02-08 RX ORDER — CIPROFLOXACIN 2 MG/ML
400 INJECTION, SOLUTION INTRAVENOUS
Status: DISCONTINUED | OUTPATIENT
Start: 2023-02-08 | End: 2023-02-08

## 2023-02-08 RX ORDER — LIDOCAINE HYDROCHLORIDE 20 MG/ML
INJECTION, SOLUTION EPIDURAL; INFILTRATION; INTRACAUDAL; PERINEURAL PRN
Status: DISCONTINUED | OUTPATIENT
Start: 2023-02-08 | End: 2023-02-08 | Stop reason: SDUPTHER

## 2023-02-08 RX ORDER — PROCHLORPERAZINE EDISYLATE 5 MG/ML
INJECTION INTRAMUSCULAR; INTRAVENOUS
Status: COMPLETED
Start: 2023-02-08 | End: 2023-02-08

## 2023-02-08 RX ORDER — HYDROMORPHONE HCL 110MG/55ML
0.25 PATIENT CONTROLLED ANALGESIA SYRINGE INTRAVENOUS EVERY 5 MIN PRN
Status: DISCONTINUED | OUTPATIENT
Start: 2023-02-08 | End: 2023-02-08 | Stop reason: HOSPADM

## 2023-02-08 RX ADMIN — HYDROMORPHONE HYDROCHLORIDE 0.5 MG: 2 INJECTION, SOLUTION INTRAMUSCULAR; INTRAVENOUS; SUBCUTANEOUS at 10:55

## 2023-02-08 RX ADMIN — PROCHLORPERAZINE EDISYLATE 5 MG: 5 INJECTION INTRAMUSCULAR; INTRAVENOUS at 11:13

## 2023-02-08 RX ADMIN — PROPOFOL 200 MG: 10 INJECTION, EMULSION INTRAVENOUS at 09:44

## 2023-02-08 RX ADMIN — SUGAMMADEX 200 MG: 100 INJECTION, SOLUTION INTRAVENOUS at 10:09

## 2023-02-08 RX ADMIN — HYDROCODONE BITARTRATE AND ACETAMINOPHEN 1 TABLET: 5; 325 TABLET ORAL at 11:44

## 2023-02-08 RX ADMIN — SODIUM CHLORIDE, POTASSIUM CHLORIDE, SODIUM LACTATE AND CALCIUM CHLORIDE: 600; 310; 30; 20 INJECTION, SOLUTION INTRAVENOUS at 10:27

## 2023-02-08 RX ADMIN — HYDROMORPHONE HYDROCHLORIDE 0.5 MG: 2 INJECTION, SOLUTION INTRAMUSCULAR; INTRAVENOUS; SUBCUTANEOUS at 11:06

## 2023-02-08 RX ADMIN — SUCCINYLCHOLINE CHLORIDE 140 MG: 20 INJECTION, SOLUTION INTRAMUSCULAR; INTRAVENOUS at 09:44

## 2023-02-08 RX ADMIN — SODIUM CHLORIDE, POTASSIUM CHLORIDE, SODIUM LACTATE AND CALCIUM CHLORIDE: 600; 310; 30; 20 INJECTION, SOLUTION INTRAVENOUS at 07:46

## 2023-02-08 RX ADMIN — ROCURONIUM BROMIDE 20 MG: 10 INJECTION, SOLUTION INTRAVENOUS at 09:58

## 2023-02-08 RX ADMIN — LEVOFLOXACIN 500 MG: 5 INJECTION, SOLUTION INTRAVENOUS at 09:32

## 2023-02-08 RX ADMIN — FENTANYL CITRATE 100 MCG: 50 INJECTION, SOLUTION INTRAMUSCULAR; INTRAVENOUS at 09:43

## 2023-02-08 RX ADMIN — SUCCINYLCHOLINE CHLORIDE 20 MG: 20 INJECTION, SOLUTION INTRAMUSCULAR; INTRAVENOUS at 10:18

## 2023-02-08 RX ADMIN — LIDOCAINE HYDROCHLORIDE 100 MG: 20 INJECTION, SOLUTION EPIDURAL; INFILTRATION; INTRACAUDAL; PERINEURAL at 09:44

## 2023-02-08 ASSESSMENT — PAIN - FUNCTIONAL ASSESSMENT: PAIN_FUNCTIONAL_ASSESSMENT: 0-10

## 2023-02-08 ASSESSMENT — PAIN SCALES - GENERAL
PAINLEVEL_OUTOF10: 7
PAINLEVEL_OUTOF10: 7

## 2023-02-08 ASSESSMENT — PAIN DESCRIPTION - LOCATION
LOCATION: PELVIS
LOCATION: PELVIS

## 2023-02-08 ASSESSMENT — PAIN DESCRIPTION - DESCRIPTORS
DESCRIPTORS: CRAMPING
DESCRIPTORS: CRAMPING
DESCRIPTORS: ACHING;SHARP

## 2023-02-08 ASSESSMENT — LIFESTYLE VARIABLES: SMOKING_STATUS: 1

## 2023-02-08 NOTE — ANESTHESIA PRE PROCEDURE
Department of Anesthesiology  Preprocedure Note       Name:  Chasity Cazares   Age:  33 y.o.  :  1995                                          MRN:  [id number]         Date:  2023      Surgeon: Ronda Heredia):  Marilee Stokes MD    Procedure: Procedure(s):  CYSTOSCOPY WITH LEFT URETEROSCOPY WITH HOLMIUM LASER LITHOTRIPSY, STONE MANIPULATION, STONE BASKET POSSIBLE LEFT STENT PLACEMENT-[facility] #[id number]    Medications prior to admission:   Prior to Admission medications    Not on File       Current medications:    Current Facility-Administered Medications   Medication Dose Route Frequency Provider Last Rate Last Admin    ciprofloxacin (CIPRO) IVPB 400 mg  400 mg IntraVENous On Call to Mouna Leo MD        lactated ringers IV soln infusion   IntraVENous Continuous Marilee Stokes MD 50 mL/hr at 23 0746 New Bag at 23 0746    lidocaine PF 1 % injection 0.5 mL  0.5 mL IntraDERmal Once Marilee Stokes MD           Allergies: Allergies   Allergen Reactions    Reglan [Metoclopramide] Shortness Of Breath     Rapid breathing    Zofran [Ondansetron Hcl] Shortness Of Breath       Problem List:  There is no problem list on file for this patient.       Past Medical History:        Diagnosis Date    Seizures ([address]        Past Surgical History:        Procedure Laterality Date    APPENDECTOMY      BREAST SURGERY      CARPAL TUNNEL RELEASE Bilateral     CHOLECYSTECTOMY         Social History:    Social History     Tobacco Use    Smoking status: Every Day     Packs/day: 0.50     Years: 3.00     Pack years: 1.50     Types: Cigarettes    Smokeless tobacco: Never   Substance Use Topics    Alcohol use: Yes     Comment: social                                 Ready to quit: Not Answered  Counseling given: Not Answered      Vital Signs (Current):   Vitals:    23 0735   BP: 105/63   Pulse: 90   Resp: 16   Temp: 97.6 °F (36.4 °C)   TempSrc: Temporal   SpO2: 100%   Weight: 159 lb (72.1 kg) BP Readings from Last 3 Encounters:   02/08/23 105/63   01/17/23 (!) 140/96   04/11/22 123/85       NPO Status: Time of last liquid consumption: 2300                        Time of last solid consumption: 2100                        Date of last liquid consumption: 02/07/23                        Date of last solid food consumption: 02/07/23    BMI:   Wt Readings from Last 3 Encounters:   02/08/23 159 lb (72.1 kg)   01/17/23 157 lb (71.2 kg)   04/11/22 158 lb (71.7 kg)     Body mass index is 32.11 kg/m². CBC:   Lab Results   Component Value Date/Time    WBC 7.7 01/17/2023 06:29 PM    RBC 4.55 01/17/2023 06:29 PM    HGB 14.4 01/17/2023 06:29 PM    HCT 42.2 01/17/2023 06:29 PM    MCV 92.9 01/17/2023 06:29 PM    RDW 13.5 01/17/2023 06:29 PM     01/17/2023 06:29 PM       CMP:   Lab Results   Component Value Date/Time     01/17/2023 06:29 PM    K 3.9 01/17/2023 06:29 PM     01/17/2023 06:29 PM    CO2 26 01/17/2023 06:29 PM    BUN 11 01/17/2023 06:29 PM    CREATININE 0.9 01/17/2023 06:29 PM    GFRAA >60 08/15/2019 02:57 PM    AGRATIO 1.6 01/17/2023 06:29 PM    LABGLOM >60 01/17/2023 06:29 PM    GLUCOSE 120 01/17/2023 06:29 PM    PROT 7.4 01/17/2023 06:29 PM    CALCIUM 9.3 01/17/2023 06:29 PM    BILITOT 1.2 01/17/2023 06:29 PM    ALKPHOS 56 01/17/2023 06:29 PM    AST 17 01/17/2023 06:29 PM    ALT 10 01/17/2023 06:29 PM       POC Tests: No results for input(s): POCGLU, POCNA, POCK, POCCL, POCBUN, POCHEMO, POCHCT in the last 72 hours.     Coags: No results found for: PROTIME, INR, APTT    HCG (If Applicable):   Lab Results   Component Value Date    PREGTESTUR Negative 02/08/2023        ABGs: No results found for: PHART, PO2ART, GDU6GIQ, UCJ6TYR, BEART, C6MHAVJY     Type & Screen (If Applicable):  No results found for: LABABO, LABRH    Drug/Infectious Status (If Applicable):  No results found for: HIV, HEPCAB    COVID-19 Screening (If Applicable): No results found for: [address] [facility]        Anesthesia Evaluation  Patient summary reviewed and Nursing notes reviewed  Airway: Mallampati: II  TM distance: >3 FB   Neck ROM: full  Mouth opening: > = 3 FB   Dental:          Pulmonary:   (+) current smoker                           Cardiovascular:  Exercise tolerance: good (>4 METS),                     Neuro/Psych:               GI/Hepatic/Renal:             Endo/Other:                     Abdominal:             Vascular: Other Findings:           Anesthesia Plan      general     ASA 3       Induction: intravenous and rapid sequence. MIPS: Prophylactic antiemetics administered. Anesthetic plan and risks discussed with patient. Plan discussed with CRNA.           Post-op pain plan if not by surgeon: single peripheral nerve block            Martin Bolden MD   2/8/2023

## 2023-02-08 NOTE — PROGRESS NOTES
Pt to PACU from OR, arouses to voice. VSS- on 6L simple mask satting high 90s. NSR on monitor. RR easy. No stent/string placed.  Will continue to monitor

## 2023-02-08 NOTE — ANESTHESIA POSTPROCEDURE EVALUATION
Department of Anesthesiology  Postprocedure Note    Patient: Radha Matt  MRN: [id number]  [place]: 1995  Date of evaluation: 2/8/2023      Procedure Summary     Date: 02/08/23 Room / Location: [address]    Anesthesia Start: 9250 Anesthesia Stop: 2747    Procedure: CYSTOSCOPY WITH LEFT DIAGNOSTIC URETEROSCOPY, LEFT RETROGRADE PYLEOGRAM (Urethra) Diagnosis:       Calculus, ureter      (Calculus, ureter [N20.1])    Surgeons: Alessandra Betts MD Responsible Provider: Genie Soto MD    Anesthesia Type: general ASA Status: 3          Anesthesia Type: No value filed.     Roopa Phase I: Roopa Score: 10    Roopa Phase II: Roopa Score: 10      Anesthesia Post Evaluation    Patient location during evaluation: PACU  Patient participation: complete - patient participated  Level of consciousness: awake and alert  Pain score: 2  Airway patency: patent  Nausea & Vomiting: no vomiting  Complications: no  Cardiovascular status: blood pressure returned to baseline  Respiratory status: acceptable  Hydration status: euvolemic  Multimodal analgesia pain management approach

## 2023-02-08 NOTE — H&P
Urology Preoperative History & Physical  [facility]     Patient: Moe Brown MRN: [id number]  Room/Bed: OR/NONE   YOB: 1995  Age/Sex: 33 y. o.female  Admission Date: 2/8/2023     Date of Service:  2/8/2023    ASSESSMENT/PLAN     Left ureteral stone with failed trial of passage here for left ureteroscopy    Plan: To OR for above procedure    All patient questions were answered. She understands the plan as listed above. HISTORY     Chief Complaint: As above    History of Present Illness: Moe Brown is a 33 y.o. female with above listed problems. No changes in history/physical since last evaluation. No change in symptoms. Past Medical History:  She has a past medical history of Seizures ([address] Hospital Problem List:  Active Problems:    * No active hospital problems. *  Resolved Problems:    * No resolved hospital problems. *      Past Surgical History:  She has a past surgical history that includes Breast surgery; Appendectomy; Cholecystectomy; and Carpal tunnel release (Bilateral). Social History:  She reports that she has been smoking cigarettes. She has a 1.50 pack-year smoking history. She has never used smokeless tobacco. She reports current alcohol use. She reports that she does not currently use drugs. Family History:  family history is not on file. Allergies: Allergies   Allergen Reactions    Reglan [Metoclopramide] Shortness Of Breath     Rapid breathing    Zofran [Ondansetron Hcl] Shortness Of Breath       Medications:  Scheduled Meds:   lidocaine PF  0.5 mL IntraDERmal Once    levofloxacin  500 mg IntraVENous Q24H     Continuous Infusions:   lactated ringers IV soln 50 mL/hr at 02/08/23 0850     PRN Meds:    Review of Systems:  Pertinent positives/negatives reviewed in HPI. All other systems reviewed and negative, unless noted below.     Constitutional: Negative  Genitourinary: see HPI  HEENT: Negative   Cardiovascular: Negative Respiratory: Negative   Gastrointestinal: Negative   Musculoskeletal: Negative   Neurological: Negative   Psychiatric: Negative   Integumentary: Negative     PHYSICAL EXAM     Vitals:    02/08/23 0735   BP: 105/63   Pulse: 90   Resp: 16   Temp: 97.6 °F (36.4 °C)   SpO2: 100%     CONSTITUTIONAL: The patient is well nourished/developed, with no distress noted. CARDIOVASCULAR: normal rate. RESPIRATOR: non-labored breathing. GENITOURINARY: Defer to OR; see clinic note for  exam.    Ins/Outs:  No intake or output data in the 24 hours ending 02/08/23 0902    LABS     CBC   Lab Results   Component Value Date/Time    WBC 7.7 01/17/2023 06:29 PM    RBC 4.55 01/17/2023 06:29 PM    HGB 14.4 01/17/2023 06:29 PM    HCT 42.2 01/17/2023 06:29 PM    MCV 92.9 01/17/2023 06:29 PM    MCH 31.8 01/17/2023 06:29 PM    MCHC 34.2 01/17/2023 06:29 PM    RDW 13.5 01/17/2023 06:29 PM     01/17/2023 06:29 PM    MPV 7.9 01/17/2023 06:29 PM     BMP   Lab Results   Component Value Date/Time     01/17/2023 06:29 PM    K 3.9 01/17/2023 06:29 PM     01/17/2023 06:29 PM    CO2 26 01/17/2023 06:29 PM    BUN 11 01/17/2023 06:29 PM    CREATININE 0.9 01/17/2023 06:29 PM    GLUCOSE 120 01/17/2023 06:29 PM    CALCIUM 9.3 01/17/2023 06:29 PM     Urinalysis:   Lab Results   Component Value Date/Time    COLORU RED 01/17/2023 06:29 PM    GLUCOSEU Negative 01/17/2023 06:29 PM    BLOODU LARGE 01/17/2023 06:29 PM    NITRU Negative 01/17/2023 06:29 PM    LEUKOCYTESUR TRACE 01/17/2023 06:29 PM     Urine culture: No results for input(s): LABURIN in the last 72 hours. PSA: No results found for: PSA      IMAGING     CT ABDOMEN PELVIS W IV CONTRAST Additional Contrast? None    Result Date: 1/17/2023  EXAM: CT ABDOMEN PELVIS W IV CONTRAST INDICATION: Left-sided back and flank pain, COMPARISON: None. TECHNIQUE: Axial CT imaging obtained from lung bases through pelvis. Axial images and multiplanar reformatted images are provided for review. Up-to-date CT equipment and radiation dose reduction techniques were employed. IV Contrast: 80 cc Isovue 370 Oral Contrast: No. FINDINGS: The visualized lung bases are clear. The heart size is normal without pericardial effusion. The liver enhances homogenously. No focal intrahepatic lesions are seen. The gallbladder is surgically absent. There is no intrahepatic or extrahepatic biliary ductal dilatation. The spleen enhances homogenously. The pancreas is normal. The adrenal glands are normal. There is a 4 mm obstructing calculus in the mid left ureter resulting in mild left hydronephrosis and proximal hydroureter. No additional renal or ureteral calculi are seen. There is no evidence of pyelonephrosis. Urinary bladder is nondistended and appears normal. Uterus is unremarkable. There is no free fluid in the pelvis. The stomach is normal. The small bowel and large bowel are normal in course and caliber. There is no evidence of bowel wall thickening or bowel obstruction. The appendix is surgically absent. No free intraperitoneal air is seen. No lymphadenopathy is seen. The abdominal aorta is normal in course and caliber. The remaining enhanced abdominal vascular structures are normal. Bone windows demonstrate no suspicious lytic or blastic lesions. 1.  4 mm obstructing calculus in the mid left ureter resulting in mild left hydronephrosis and hydroureter.             Electronically signed by: Juarez Parson MD MD, ENRIQUE 2/8/2023   [facility]  Office Contact: [phone]

## 2023-02-08 NOTE — OP NOTE
Urology Operative Report  [facility]    Provider: Karthik Dubois MD MD Patient ID:  Admission Date: 2023 Name: Patrice Simpson  OR Date: 2023  MRN: [id number]   Patient Location: OR/NONE : 1995  Attending: Karthik Dubois MD Date of Service: 2023  PCP: Edith Taylor     Date of Operation: 2023    Preoperative Diagnosis: LEFT side 4 mm proximal ureteral stone    Postoperative Diagnosis: same    Procedure:    1. Diagnostic left ureteroscopy  2. Cystourethroscopy with left retrograde pyelogram  3. Fluoroscopic imaging < 1 hr physician time    Surgeon:   Karthik Dubois MD, ENRIQUE    Anesthesia: General LMA anesthesia    Indications: Patrice Simpson is a 33 y.o. female who presents for the above named surgery. Informed consent was obtained and the risks, benefits, and details of the procedure were explained to the patient who elected to proceed. Details of Procedure: The patient was brought to the operating room and placed in the supine position on the operating room table. SCDs were placed on the lower extremities. Following induction of anesthesia the patient was positioned in a lithotomy position, all pressure points were padded, and the genitals were prepped and draped in the usual sterile fashion. A routine timeout was performed, confirming the patient, procedure, site, risk of fire, patient allergies and confirming that preoperative antibiotics had been administered prior to beginning. A 21 fr rigid cystoscope was advance via a normal appearing urethra into the bladder. The bladder was inspected and there were no suspicious lesions, stones or diverticula seen. Attention was turned to the left ureteral orifice and a 0.035 sensor wire was advance up to the renal pelvis under control of fluoroscopy. A semi-rigid ureteroscope was passed alongside the safety wire. The ureteral stone was not seen on careful examination of the entire ureter.   I actually advanced the scope all the way into the renal pelvis and again no stone was seen. Retrograde pyelogram was performed showing no filling defects extravasation of abnormalities. There was prompt drainage of contrast given the atraumatic nature of the ureteroscopy procedure I elected to not leave ureteral stent in place. The bladder was emptied and again fluoroscopy demonstrated full drainage and prompt drainage of contrast.    At the end of the procedure all counts were correct. The patient tolerated the procedure well and was transported to the PACU in stable condition. Findings: Passage of left ureteral stone, normal retrograde pyelogram without evidence of filling defects extravasation or dilation of the collecting system    Estimated Blood Loss: minimal                  Drains: None          Specimens: None    Complications: none apparent           Disposition: PACU - hemodynamically stable.             Plan: Follow-up postoperative imaging in 6 weeks with general stone prevention and possible metabolic evaluation      Dionne Cook MD, [facility]  2/8/2023

## 2023-02-08 NOTE — DISCHARGE INSTRUCTIONS
Call Dr. Donita Ogden for any questions or concerns [phone]    Schedule ultrasound prior to follow up in 6 weeks    UROLOGY DISCHARGE INSTRUCTIONS    Follow your surgeons instructions. Your urine may look pink and it may burn when you urinate. You may also feel like you have to urinate often. Call your surgeon if your temperature is higher than 101 degrees, your urine is grossly bloody, or has large clots. Drink plenty of fluids unless your physician advises against it. If you can not urinate once you are home, call your surgeon or go to the Emergency Room. If you are discharged with a catheter into your bladder follow instructions on care and removal. ( See cathter removal/ care sheet.)  Take medications as directed. Take pain medication with food. Do not drive or operate machinery while taking narcotics. Call your surgeon for any problems or questions  ANESTHESIA DISCHARGE INSTRUCTIONS    Wear your seatbelt home. You are under the influence of drugs-do not drink alcohol, drive, operate machinery, make any important decisions or sign any legal documents for 24 hours. Children should not ride bikes, [place] or play on gym sets for 24 hours after surgery. A responsible adult needs to be with you for 24 hours. You may experience lightheadedness, dizziness, or sleepiness following surgery. Rest at home today- increase activity as tolerated. Progress slowly to a regular diet unless your physician has instructed you otherwise. Drink plenty of water. If persistent nausea and vomiting becomes a problem, call your physician. Coughing, sore throat and muscle aches are other side effects of anesthesia, and should improve with time. Do not drive or operate machinery while taking narcotics. Females of childbearing potential and on hormonal birth control, should use two forms of contraception following procedure if given a medication called sugammadex and/or emend.  Additional contraception should be used for 7 days for sugammadex and/or 28 days for emend. These medications have a potential to reduce the effectiveness of hormonal birth control.

## 2023-02-08 NOTE — PROGRESS NOTES
Pt awake resting comfortably in bed, VSS- on RA satting mid to high 90s. RR easy. Pt states pain is tolerable and denies nausea.  Phase one criteria met, will transfer to [facility] for discharge

## 2024-08-08 ENCOUNTER — HOSPITAL ENCOUNTER (EMERGENCY)
Age: 29
Discharge: HOME | End: 2024-08-08
Attending: EMERGENCY MEDICINE
Payer: COMMERCIAL

## 2024-08-08 VITALS
BODY MASS INDEX: 28.05 KG/M2 | HEART RATE: 97 BPM | DIASTOLIC BLOOD PRESSURE: 66 MMHG | SYSTOLIC BLOOD PRESSURE: 116 MMHG | RESPIRATION RATE: 18 BRPM | TEMPERATURE: 98 F | WEIGHT: 138.89 LBS | OXYGEN SATURATION: 99 %

## 2024-08-08 DIAGNOSIS — L30.9 DERMATITIS: Primary | ICD-10-CM

## 2024-08-08 PROCEDURE — 99283 EMERGENCY DEPT VISIT LOW MDM: CPT

## 2024-08-08 PROCEDURE — 6370000000 HC RX 637 (ALT 250 FOR IP): Performed by: EMERGENCY MEDICINE

## 2024-08-08 RX ORDER — DIPHENHYDRAMINE HCL 25 MG
25 TABLET ORAL EVERY 6 HOURS PRN
Qty: 20 TABLET | Refills: 0 | Status: SHIPPED | OUTPATIENT
Start: 2024-08-08 | End: 2024-09-07

## 2024-08-08 RX ORDER — DIPHENHYDRAMINE HCL 25 MG
25 TABLET ORAL ONCE
Status: COMPLETED | OUTPATIENT
Start: 2024-08-08 | End: 2024-08-08

## 2024-08-08 RX ADMIN — DIPHENHYDRAMINE HYDROCHLORIDE 25 MG: 25 TABLET ORAL at 04:10

## 2024-08-08 NOTE — ED PROVIDER NOTES
[facility] EMERGENCY DEPARTMENT  eMERGENCY dEPARTMENT eNCOUnter      Pt Name: Abbie Davis  MRN: [id number]  Birthdate 1995  Date of evaluation: 8/8/2024  Provider: Amador Yates MD  PCP: Fabrice Hill MD      CHIEF COMPLAINT       Chief Complaint   Patient presents with    Rash       HISTORY OFPRESENT ILLNESS   (Location/Symptom, Timing/Onset, Context/Setting, Quality, Duration, Modifying Factors,Severity)  Note limiting factors.     Abbie Davis is a 29 y.o. female presents with complaints of rash scattered underneath her chin on both lower legs and on her flank it is erythematous is maculopapular it blanches.  She does work outside    Nursing Notes were all reviewed and agreed with or any disagreements were addressed  in the HPI.    REVIEW OF SYSTEMS    (2-9 systems for level 4, 10 or more for level 5)     Review of Systems    Positives and Pertinent negatives as per HPI.  Except as noted above in the ROS, all other systems were reviewed andnegative.       PASTMEDICAL HISTORY     Past Medical History:   Diagnosis Date    Seizures (HCC)          SURGICAL HISTORY       Past Surgical History:   Procedure Laterality Date    APPENDECTOMY      BREAST SURGERY      CARPAL TUNNEL RELEASE Bilateral     CHOLECYSTECTOMY      CYSTOSCOPY N/A 2/8/2023    CYSTOSCOPY WITH LEFT DIAGNOSTIC URETEROSCOPY, LEFT RETROGRADE PYLEOGRAM performed by Ron Marcial MD at [facility] OR         CURRENT MEDICATIONS       Previous Medications    OXYBUTYNIN (DITROPAN) 5 MG TABLET    Take 1 tablet by mouth 3 times daily as needed (bladder spasms)    TAMSULOSIN (FLOMAX) 0.4 MG CAPSULE    Take 1 capsule by mouth daily For stent pain and/or kidney stone passage.       ALLERGIES     Reglan [metoclopramide] and Zofran [ondansetron hcl]    FAMILY HISTORY     No family history on file.       SOCIAL HISTORY       Social History     Socioeconomic History    Marital status: Single   Tobacco Use    Smoking status: Every

## (undated) DEVICE — CYSTO PACK: Brand: MEDLINE INDUSTRIES, INC.

## (undated) DEVICE — Device: Brand: MEDEX

## (undated) DEVICE — BAG DRAINAGE NS

## (undated) DEVICE — SYRINGE MED 10ML SLIP TIP BLNT FILL AND LUERLOCK DISP

## (undated) DEVICE — SOLUTION IRRIGATION STRL H2O 1000 ML UROMATIC CONTAINER

## (undated) DEVICE — BLANKET WRM W29.9XL79.1IN UP BODY FORC AIR MISTRAL-AIR

## (undated) DEVICE — MERCY FAIRFIELD TURNOVER KIT: Brand: MEDLINE INDUSTRIES, INC.

## (undated) DEVICE — Y-TYPE TUR/BLADDER IRRIGATION SET, REGULATING CLAMP

## (undated) DEVICE — GUIDEWIRE ENDOSCP L150CM DIA0.035IN TIP 3CM PTFE NIT

## (undated) DEVICE — WET SKIN PREP TRAY: Brand: MEDLINE INDUSTRIES, INC.

## (undated) DEVICE — SOLUTION IV IRRIG WATER 1000ML POUR BRL 2F7114

## (undated) DEVICE — GLOVE ORANGE PI 7   MSG9070